# Patient Record
Sex: MALE | Race: WHITE | NOT HISPANIC OR LATINO
[De-identification: names, ages, dates, MRNs, and addresses within clinical notes are randomized per-mention and may not be internally consistent; named-entity substitution may affect disease eponyms.]

---

## 2018-04-24 ENCOUNTER — APPOINTMENT (OUTPATIENT)
Dept: CARDIOLOGY | Facility: CLINIC | Age: 68
End: 2018-04-24
Payer: MEDICARE

## 2018-04-24 DIAGNOSIS — Z78.9 OTHER SPECIFIED HEALTH STATUS: ICD-10-CM

## 2018-04-24 DIAGNOSIS — Z82.49 FAMILY HISTORY OF ISCHEMIC HEART DISEASE AND OTHER DISEASES OF THE CIRCULATORY SYSTEM: ICD-10-CM

## 2018-04-24 PROBLEM — Z00.00 ENCOUNTER FOR PREVENTIVE HEALTH EXAMINATION: Status: ACTIVE | Noted: 2018-04-24

## 2018-04-24 PROCEDURE — 99204 OFFICE O/P NEW MOD 45 MIN: CPT

## 2018-04-30 VITALS
BODY MASS INDEX: 23.52 KG/M2 | OXYGEN SATURATION: 96 % | WEIGHT: 168 LBS | DIASTOLIC BLOOD PRESSURE: 90 MMHG | HEIGHT: 71 IN | SYSTOLIC BLOOD PRESSURE: 156 MMHG | HEART RATE: 74 BPM | RESPIRATION RATE: 16 BRPM

## 2018-04-30 PROBLEM — Z82.49 FAMILY HISTORY OF VARICOSE VEINS OF LOWER EXTREMITY: Status: ACTIVE | Noted: 2018-04-30

## 2018-04-30 PROBLEM — Z78.9 CURRENT NON-SMOKER: Status: ACTIVE | Noted: 2018-04-30

## 2020-05-03 ENCOUNTER — TRANSCRIPTION ENCOUNTER (OUTPATIENT)
Age: 70
End: 2020-05-03

## 2022-03-14 ENCOUNTER — APPOINTMENT (OUTPATIENT)
Dept: CT IMAGING | Facility: CLINIC | Age: 72
End: 2022-03-14
Payer: MEDICARE

## 2022-03-14 ENCOUNTER — OUTPATIENT (OUTPATIENT)
Dept: OUTPATIENT SERVICES | Facility: HOSPITAL | Age: 72
LOS: 1 days | End: 2022-03-14
Payer: MEDICARE

## 2022-03-14 DIAGNOSIS — Z00.8 ENCOUNTER FOR OTHER GENERAL EXAMINATION: ICD-10-CM

## 2022-03-14 PROCEDURE — 70450 CT HEAD/BRAIN W/O DYE: CPT | Mod: MH

## 2022-03-14 PROCEDURE — 70450 CT HEAD/BRAIN W/O DYE: CPT | Mod: 26,MH

## 2022-04-12 ENCOUNTER — APPOINTMENT (OUTPATIENT)
Dept: CARDIOLOGY | Facility: CLINIC | Age: 72
End: 2022-04-12
Payer: MEDICARE

## 2022-04-12 PROCEDURE — 99215 OFFICE O/P EST HI 40 MIN: CPT

## 2022-04-15 NOTE — ASSESSMENT
[FreeTextEntry1] : Assessment:\par 1.  Chronic Venous insufficiency \par s/p GSV ligation on the left\par wearing compression\par symptomatic\par 2.  Prior episodes of phlebitis\par 3.  Family history of VV\par \par Plan\par 1.   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  Venous duplex today shows GSV thrombus.. The thrombus is very close to the sapheno-femoral junction.  Would treat as DVT, however he is [employer], and will do half dose therapies as a compromise between clotting risk and bleeding risk . \par 3.  Start half dose xarleto 10mg daily ([facility] trial) for symptomatic SVT\par 4.  Return in 45 days to re-assess\par 5.  his father and uncle had prostate cancer, his PSA is elevated.  I wonder if he should be evaluated for elevated PSA..\par 6.  Stop nanokinase.  Stop Vitamin C\par 7.  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

## 2022-04-15 NOTE — REASON FOR VISIT
[Follow-Up - Clinic] : a clinic follow-up of [FreeTextEntry2] : Varicose Veins [FreeTextEntry1] : 4/12/2022\par Here for followup visit.  \par Doing well\par Here with his wife\par Katieitred 1 year ago. \par He has a palpable cord of the R GSV, large segment\par Leg was itchy, red and painful\par It has improved since then, but there is still a cord.  \par He reports that his PSA is elevated.  Seen by urologist in November/December.\par \par \par Medications:\par Losartan 25mg daily \par \par No other meds. \par \par 2018:\par 67 year old male here for evaluation of varicose veins.  He has had VV for many years.  3 years ago he underwent a left GSV ligation at [place] with Dr. Bartlett.  He states that he has darkening of the skin of the R medial malleolar area.  He has "flares" sometimes which are relieved with heat, walking, compression and topical cayenne pepper.  He works - stands all day.  He has had several episodes of superficial phelbitis, but not currently.  He is here with his wife.  No prior DVt.  No history of DVT.  His las venous duplex was several years ago.  \par \par Family history\par Mother with Varicose Veins\par \par \par Meds:\par Natto supplement\par

## 2022-04-15 NOTE — PHYSICAL EXAM
[General Appearance - Well Developed] : well developed [Normal Appearance] : normal appearance [Well Groomed] : well groomed [General Appearance - Well Nourished] : well nourished [No Deformities] : no deformities [General Appearance - In No Acute Distress] : no acute distress [Normal Conjunctiva] : the conjunctiva exhibited no abnormalities [Eyelids - No Xanthelasma] : the eyelids demonstrated no xanthelasmas [Normal Oral Mucosa] : normal oral mucosa [No Oral Pallor] : no oral pallor [No Oral Cyanosis] : no oral cyanosis [Normal Jugular Venous A Waves Present] : normal jugular venous A waves present [Normal Jugular Venous V Waves Present] : normal jugular venous V waves present [No Jugular Venous Wiseman A Waves] : no jugular venous wiseman A waves [Respiration, Rhythm And Depth] : normal respiratory rhythm and effort [Exaggerated Use Of Accessory Muscles For Inspiration] : no accessory muscle use [Auscultation Breath Sounds / Voice Sounds] : lungs were clear to auscultation bilaterally [Heart Rate And Rhythm] : heart rate and rhythm were normal [Heart Sounds] : normal S1 and S2 [Murmurs] : no murmurs present [Abdomen Soft] : soft [Abdomen Tenderness] : non-tender [] : no hepato-splenomegaly [Abdomen Mass (___ Cm)] : no abdominal mass palpated [Abnormal Walk] : normal gait [Gait - Sufficient For Exercise Testing] : the gait was sufficient for exercise testing [Abnormal Color] : abnormal color and pigmentation [Nail Clubbing] : no clubbing of the fingernails [Cyanosis Of Fingers] : no cyanosis of the fingers [Toes Cyanosis] : no cyanosis of the toes [Raynaud's Phenomenon] : the fingers do not pallavi with cold challenge [Petechiae Upper Ext] : no petechiae on the upper extremities [Petechiae Lower Ext] : no petechiae on the lower extremities [Splinter Hemorrhages] : no splinter hemorrhages of the nail were seen [Osler's Nodes] : Osler's nodes were not seen [Ischemia Upper Ext] : there were no ischemic changes of the upper extremities [FreeTextEntry1] : Scattered varicose veins.  There is hyperpigmentation of the medial malleolar areas.   The right GSV is firm and has a cord.

## 2022-06-07 ENCOUNTER — APPOINTMENT (OUTPATIENT)
Dept: CARDIOLOGY | Facility: CLINIC | Age: 72
End: 2022-06-07
Payer: MEDICARE

## 2022-06-07 PROCEDURE — 99214 OFFICE O/P EST MOD 30 MIN: CPT

## 2022-06-29 NOTE — PHYSICAL EXAM
[General Appearance - Well Developed] : well developed [Normal Appearance] : normal appearance [Well Groomed] : well groomed [General Appearance - Well Nourished] : well nourished [No Deformities] : no deformities [General Appearance - In No Acute Distress] : no acute distress [Normal Conjunctiva] : the conjunctiva exhibited no abnormalities [Eyelids - No Xanthelasma] : the eyelids demonstrated no xanthelasmas [Normal Oral Mucosa] : normal oral mucosa [No Oral Pallor] : no oral pallor [No Oral Cyanosis] : no oral cyanosis [Normal Jugular Venous A Waves Present] : normal jugular venous A waves present [Normal Jugular Venous V Waves Present] : normal jugular venous V waves present [No Jugular Venous Wiseman A Waves] : no jugular venous wiseman A waves [Respiration, Rhythm And Depth] : normal respiratory rhythm and effort [Exaggerated Use Of Accessory Muscles For Inspiration] : no accessory muscle use [Auscultation Breath Sounds / Voice Sounds] : lungs were clear to auscultation bilaterally [Heart Rate And Rhythm] : heart rate and rhythm were normal [Heart Sounds] : normal S1 and S2 [Murmurs] : no murmurs present [Abdomen Soft] : soft [Abdomen Tenderness] : non-tender [] : no hepato-splenomegaly [Abdomen Mass (___ Cm)] : no abdominal mass palpated [Abnormal Walk] : normal gait [Gait - Sufficient For Exercise Testing] : the gait was sufficient for exercise testing [Abnormal Color] : abnormal color and pigmentation [Nail Clubbing] : no clubbing of the fingernails [Cyanosis Of Fingers] : no cyanosis of the fingers [Toes Cyanosis] : no cyanosis of the toes [Raynaud's Phenomenon] : the fingers do not pallavi with cold challenge [Petechiae Upper Ext] : no petechiae on the upper extremities [Petechiae Lower Ext] : no petechiae on the lower extremities [Splinter Hemorrhages] : no splinter hemorrhages of the nail were seen [Osler's Nodes] : Osler's nodes were not seen [Ischemia Upper Ext] : there were no ischemic changes of the upper extremities [FreeTextEntry1] : Scattered varicose veins.  There is hyperpigmentation of the medial malleolar areas.   The right GSV mid to distal has a small palp cord, without symtoms

## 2022-06-29 NOTE — REASON FOR VISIT
[Follow-Up - Clinic] : a clinic follow-up of [FreeTextEntry2] : Varicose Veins [FreeTextEntry1] : 6/7/2022\par Here for followup visit\par Completed 45 days of half dose xarleto 10mg daily\par stopped in a few days ago\par on exam he has a non-tender palpable mid to distal GSV cord\par Plan for venous duplex today to re-assess shows CHRONIC R GSV thrombus\par \par NO CP or SOB\par Seeing urology tomorrow for elevated PSA\par \par 4/12/2022\par Here for followup visit.  \par Doing well\par Here with his wife\par Reitred 1 year ago. \par He has a palpable cord of the R GSV, large segment\par Leg was itchy, red and painful\par It has improved since then, but there is still a cord.  \par He reports that his PSA is elevated.  Seen by urologist in November/December.  \par \par \par Medications:\par Losartan 25mg daily \par \par No other meds. \par \par 2018:\par 67 year old male here for evaluation of varicose veins.  He has had VV for many years.  3 years ago he underwent a left GSV ligation at [place] with Dr. Bartlett.  He states that he has darkening of the skin of the R medial malleolar area.  He has "flares" sometimes which are relieved with heat, walking, compression and topical cayenne pepper.  He works - stands all day.  He has had several episodes of superficial phelbitis, but not currently.  He is here with his wife.  No prior DVt.  No history of DVT.  His las venous duplex was several years ago.  \par \par Family history\par Mother with Varicose Veins\par \par \par Meds:\par Natto supplement\par

## 2022-06-29 NOTE — ASSESSMENT
[FreeTextEntry1] : Assessment:\par 1.  Chronic Venous insufficiency \par s/p GSV ligation on the left\par wearing compression\par symptomatic\par 2.  Prior episodes of phlebitis\par 3.  Family history of VV\par \par Plan\par 1 Duplex shows CHRONIC, improving R GSV thrombus\par 2.  To remain OFF xarelto (he is Rastafarian)\par 3.  Hydration, compression, ambulation\par 4.  Repeat duplex in 3 weeks\par 5.  See uroogist for elevated PSA tomorrow\par 6.  Return in 3 months

## 2023-03-28 ENCOUNTER — APPOINTMENT (OUTPATIENT)
Dept: CARDIOLOGY | Facility: CLINIC | Age: 73
End: 2023-03-28
Payer: MEDICARE

## 2023-03-28 DIAGNOSIS — M79.669 PAIN IN UNSPECIFIED LOWER LEG: ICD-10-CM

## 2023-03-28 PROCEDURE — 99214 OFFICE O/P EST MOD 30 MIN: CPT

## 2023-03-29 ENCOUNTER — APPOINTMENT (OUTPATIENT)
Dept: ULTRASOUND IMAGING | Facility: CLINIC | Age: 73
End: 2023-03-29
Payer: MEDICARE

## 2023-03-29 PROCEDURE — 93970 EXTREMITY STUDY: CPT

## 2023-03-30 PROBLEM — M79.669 CALF PAIN: Status: ACTIVE | Noted: 2023-03-29

## 2023-03-30 NOTE — ASSESSMENT
[FreeTextEntry1] : Assessment:\par 1.  Chronic Venous insufficiency \par s/p GSV ligation on the left\par wearing compression\par symptomatic\par 2.  Prior episodes of phlebitis\par 3.  Family history of VV\par \par Plan\par  1, Will obtain venous duplex within the next 24 hours to assess L sided calf pain in patient with prior history of phlebitis\par 2.  Of note he is [employer]\par 3.  If duplex is negative, AND if symptoms persist, will need repeat duplex 1 week later\par \par Await duplex

## 2023-03-30 NOTE — REASON FOR VISIT
[Follow-Up - Clinic] : a clinic follow-up of [FreeTextEntry2] : Varicose Veins [FreeTextEntry1] : 3/28/2023\par Went for a long walk last week\par now is having LEFT sided calf pains and tenderness\par he is off a/c\par  here with his wife\par \par otherwise no sob, no CP , no palps\par \par 6/7/2022\par Here for followup visit\par Completed 45 days of half dose xarleto 10mg daily\par stopped in a few days ago\par on exam he has a non-tender palpable mid to distal GSV cord\par Plan for venous duplex today to re-assess shows CHRONIC R GSV thrombus\par \par NO CP or SOB\par Seeing urology tomorrow for elevated PSA\par \par 4/12/2022\par Here for followup visit.  \par Doing well\par Here with his wife\par Reitred 1 year ago. \par He has a palpable cord of the R GSV, large segment\par Leg was itchy, red and painful\par It has improved since then, but there is still a cord.  \par He reports that his PSA is elevated.  Seen by urologist in November/December.  \par \par \par Medications:\par Losartan 25mg daily \par \par No other meds. \par \par 2018:\par 67 year old male here for evaluation of varicose veins.  He has had VV for many years.  3 years ago he underwent a left GSV ligation at [place] with Dr. Bartlett.  He states that he has darkening of the skin of the R medial malleolar area.  He has "flares" sometimes which are relieved with heat, walking, compression and topical cayenne pepper.  He works - stands all day.  He has had several episodes of superficial phelbitis, but not currently.  He is here with his wife.  No prior DVt.  No history of DVT.  His las venous duplex was several years ago.  \par \par Family history\par Mother with Varicose Veins\par \par \par Meds:\par Natto supplement\par

## 2023-03-30 NOTE — PHYSICAL EXAM
[General Appearance - Well Developed] : well developed [Normal Appearance] : normal appearance [Well Groomed] : well groomed [General Appearance - Well Nourished] : well nourished [No Deformities] : no deformities [General Appearance - In No Acute Distress] : no acute distress [Normal Conjunctiva] : the conjunctiva exhibited no abnormalities [Eyelids - No Xanthelasma] : the eyelids demonstrated no xanthelasmas [Normal Oral Mucosa] : normal oral mucosa [No Oral Pallor] : no oral pallor [No Oral Cyanosis] : no oral cyanosis [Normal Jugular Venous A Waves Present] : normal jugular venous A waves present [Normal Jugular Venous V Waves Present] : normal jugular venous V waves present [No Jugular Venous Wismean A Waves] : no jugular venous wiseman A waves [Respiration, Rhythm And Depth] : normal respiratory rhythm and effort [Exaggerated Use Of Accessory Muscles For Inspiration] : no accessory muscle use [Auscultation Breath Sounds / Voice Sounds] : lungs were clear to auscultation bilaterally [Heart Rate And Rhythm] : heart rate and rhythm were normal [Heart Sounds] : normal S1 and S2 [Murmurs] : no murmurs present [Abdomen Soft] : soft [Abdomen Tenderness] : non-tender [] : no hepato-splenomegaly [Abdomen Mass (___ Cm)] : no abdominal mass palpated [Abnormal Walk] : normal gait [Gait - Sufficient For Exercise Testing] : the gait was sufficient for exercise testing [Abnormal Color] : abnormal color and pigmentation [Nail Clubbing] : no clubbing of the fingernails [Cyanosis Of Fingers] : no cyanosis of the fingers [Toes Cyanosis] : no cyanosis of the toes [Raynaud's Phenomenon] : the fingers do not pallavi with cold challenge [Petechiae Upper Ext] : no petechiae on the upper extremities [Petechiae Lower Ext] : no petechiae on the lower extremities [Splinter Hemorrhages] : no splinter hemorrhages of the nail were seen [Osler's Nodes] : Osler's nodes were not seen [Ischemia Upper Ext] : there were no ischemic changes of the upper extremities [FreeTextEntry1] : Scattered varicose veins.  There is hyperpigmentation of the medial malleolar areas.   The right GSV mid to distal has a small palp cord, without symtoms

## 2023-03-31 ENCOUNTER — NON-APPOINTMENT (OUTPATIENT)
Age: 73
End: 2023-03-31

## 2023-03-31 LAB
ALBUMIN SERPL ELPH-MCNC: 4.4 G/DL
ALP BLD-CCNC: 82 U/L
ALT SERPL-CCNC: 24 U/L
ANION GAP SERPL CALC-SCNC: 13 MMOL/L
AST SERPL-CCNC: 24 U/L
BILIRUB SERPL-MCNC: 0.6 MG/DL
BUN SERPL-MCNC: 18 MG/DL
CALCIUM SERPL-MCNC: 9.3 MG/DL
CHLORIDE SERPL-SCNC: 104 MMOL/L
CO2 SERPL-SCNC: 24 MMOL/L
CREAT SERPL-MCNC: 0.84 MG/DL
DEPRECATED D DIMER PPP IA-ACNC: 1061 NG/ML DDU
EGFR: 92 ML/MIN/1.73M2
GLUCOSE SERPL-MCNC: 81 MG/DL
HCT VFR BLD CALC: 46.8 %
HGB BLD-MCNC: 15.5 G/DL
MCHC RBC-ENTMCNC: 30.2 PG
MCHC RBC-ENTMCNC: 33.1 GM/DL
MCV RBC AUTO: 91.2 FL
PLATELET # BLD AUTO: 186 K/UL
POTASSIUM SERPL-SCNC: 4 MMOL/L
PROT SERPL-MCNC: 6.9 G/DL
RBC # BLD: 5.13 M/UL
RBC # FLD: 14 %
SODIUM SERPL-SCNC: 140 MMOL/L
WBC # FLD AUTO: 6.04 K/UL

## 2023-03-31 RX ORDER — RIVAROXABAN 10 MG/1
10 TABLET, FILM COATED ORAL
Qty: 90 | Refills: 3 | Status: DISCONTINUED | COMMUNITY
Start: 2022-04-12 | End: 2023-03-31

## 2023-05-16 ENCOUNTER — INPATIENT (INPATIENT)
Facility: HOSPITAL | Age: 73
LOS: 5 days | Discharge: HOME | DRG: 163 | End: 2023-05-22
Attending: STUDENT IN AN ORGANIZED HEALTH CARE EDUCATION/TRAINING PROGRAM | Admitting: FAMILY MEDICINE
Payer: MEDICARE

## 2023-05-16 ENCOUNTER — EMERGENCY (EMERGENCY)
Facility: HOSPITAL | Age: 73
LOS: 1 days | Discharge: ANOTHER HOSPITAL | End: 2023-05-16
Attending: EMERGENCY MEDICINE | Admitting: EMERGENCY MEDICINE
Payer: MEDICARE

## 2023-05-16 VITALS
OXYGEN SATURATION: 100 % | DIASTOLIC BLOOD PRESSURE: 89 MMHG | SYSTOLIC BLOOD PRESSURE: 153 MMHG | HEART RATE: 103 BPM | RESPIRATION RATE: 16 BRPM | TEMPERATURE: 98 F

## 2023-05-16 VITALS
HEART RATE: 95 BPM | WEIGHT: 169.98 LBS | TEMPERATURE: 97 F | DIASTOLIC BLOOD PRESSURE: 82 MMHG | RESPIRATION RATE: 18 BRPM | HEIGHT: 70 IN | OXYGEN SATURATION: 98 % | SYSTOLIC BLOOD PRESSURE: 140 MMHG

## 2023-05-16 VITALS
DIASTOLIC BLOOD PRESSURE: 106 MMHG | RESPIRATION RATE: 20 BRPM | HEIGHT: 70 IN | SYSTOLIC BLOOD PRESSURE: 159 MMHG | OXYGEN SATURATION: 95 % | WEIGHT: 149.69 LBS | TEMPERATURE: 98 F | HEART RATE: 99 BPM

## 2023-05-16 DIAGNOSIS — Z90.49 ACQUIRED ABSENCE OF OTHER SPECIFIED PARTS OF DIGESTIVE TRACT: Chronic | ICD-10-CM

## 2023-05-16 DIAGNOSIS — I26.92 SADDLE EMBOLUS OF PULMONARY ARTERY WITHOUT ACUTE COR PULMONALE: ICD-10-CM

## 2023-05-16 DIAGNOSIS — I10 ESSENTIAL (PRIMARY) HYPERTENSION: ICD-10-CM

## 2023-05-16 DIAGNOSIS — R09.89 OTHER SPECIFIED SYMPTOMS AND SIGNS INVOLVING THE CIRCULATORY AND RESPIRATORY SYSTEMS: ICD-10-CM

## 2023-05-16 DIAGNOSIS — I26.99 OTHER PULMONARY EMBOLISM WITHOUT ACUTE COR PULMONALE: ICD-10-CM

## 2023-05-16 DIAGNOSIS — U07.1 COVID-19: ICD-10-CM

## 2023-05-16 LAB
ALBUMIN SERPL ELPH-MCNC: 3.7 G/DL — SIGNIFICANT CHANGE UP (ref 3.3–5)
ALP SERPL-CCNC: 96 U/L — SIGNIFICANT CHANGE UP (ref 40–120)
ALT FLD-CCNC: 31 U/L — SIGNIFICANT CHANGE UP (ref 12–78)
ANION GAP SERPL CALC-SCNC: 13 MMOL/L — SIGNIFICANT CHANGE UP (ref 5–17)
ANION GAP SERPL CALC-SCNC: 4 MMOL/L — LOW (ref 5–17)
APTT BLD: 26.6 SEC — LOW (ref 27.5–35.5)
APTT BLD: 91.7 SEC — HIGH (ref 27.5–35.5)
AST SERPL-CCNC: 20 U/L — SIGNIFICANT CHANGE UP (ref 15–37)
BASOPHILS # BLD AUTO: 0.03 K/UL — SIGNIFICANT CHANGE UP (ref 0–0.2)
BASOPHILS # BLD AUTO: 0.03 K/UL — SIGNIFICANT CHANGE UP (ref 0–0.2)
BASOPHILS NFR BLD AUTO: 0.3 % — SIGNIFICANT CHANGE UP (ref 0–2)
BASOPHILS NFR BLD AUTO: 0.4 % — SIGNIFICANT CHANGE UP (ref 0–2)
BILIRUB SERPL-MCNC: 0.9 MG/DL — SIGNIFICANT CHANGE UP (ref 0.2–1.2)
BUN SERPL-MCNC: 15.1 MG/DL — SIGNIFICANT CHANGE UP (ref 8–20)
BUN SERPL-MCNC: 22 MG/DL — SIGNIFICANT CHANGE UP (ref 7–23)
CALCIUM SERPL-MCNC: 8.6 MG/DL — SIGNIFICANT CHANGE UP (ref 8.4–10.5)
CALCIUM SERPL-MCNC: 8.8 MG/DL — SIGNIFICANT CHANGE UP (ref 8.5–10.1)
CHLORIDE SERPL-SCNC: 104 MMOL/L — SIGNIFICANT CHANGE UP (ref 96–108)
CHLORIDE SERPL-SCNC: 105 MMOL/L — SIGNIFICANT CHANGE UP (ref 96–108)
CK MB BLD-MCNC: 5.5 % — HIGH (ref 0–3.5)
CK MB CFR SERPL CALC: 2.8 NG/ML — SIGNIFICANT CHANGE UP (ref 0–3.6)
CK SERPL-CCNC: 51 U/L — SIGNIFICANT CHANGE UP (ref 26–308)
CO2 SERPL-SCNC: 23 MMOL/L — SIGNIFICANT CHANGE UP (ref 22–29)
CO2 SERPL-SCNC: 27 MMOL/L — SIGNIFICANT CHANGE UP (ref 22–31)
CREAT SERPL-MCNC: 0.79 MG/DL — SIGNIFICANT CHANGE UP (ref 0.5–1.3)
CREAT SERPL-MCNC: 0.91 MG/DL — SIGNIFICANT CHANGE UP (ref 0.5–1.3)
EGFR: 89 ML/MIN/1.73M2 — SIGNIFICANT CHANGE UP
EGFR: 94 ML/MIN/1.73M2 — SIGNIFICANT CHANGE UP
EOSINOPHIL # BLD AUTO: 0.02 K/UL — SIGNIFICANT CHANGE UP (ref 0–0.5)
EOSINOPHIL # BLD AUTO: 0.09 K/UL — SIGNIFICANT CHANGE UP (ref 0–0.5)
EOSINOPHIL NFR BLD AUTO: 0.2 % — SIGNIFICANT CHANGE UP (ref 0–6)
EOSINOPHIL NFR BLD AUTO: 1.1 % — SIGNIFICANT CHANGE UP (ref 0–6)
FLUAV AG NPH QL: SIGNIFICANT CHANGE UP
FLUBV AG NPH QL: SIGNIFICANT CHANGE UP
GLUCOSE SERPL-MCNC: 101 MG/DL — HIGH (ref 70–99)
GLUCOSE SERPL-MCNC: 109 MG/DL — HIGH (ref 70–99)
HCT VFR BLD CALC: 44.8 % — SIGNIFICANT CHANGE UP (ref 39–50)
HCT VFR BLD CALC: 45.8 % — SIGNIFICANT CHANGE UP (ref 39–50)
HCT VFR BLD CALC: 49.9 % — SIGNIFICANT CHANGE UP (ref 39–50)
HGB BLD-MCNC: 15.3 G/DL — SIGNIFICANT CHANGE UP (ref 13–17)
HGB BLD-MCNC: 15.7 G/DL — SIGNIFICANT CHANGE UP (ref 13–17)
HGB BLD-MCNC: 16.4 G/DL — SIGNIFICANT CHANGE UP (ref 13–17)
IMM GRANULOCYTES NFR BLD AUTO: 0.3 % — SIGNIFICANT CHANGE UP (ref 0–0.9)
IMM GRANULOCYTES NFR BLD AUTO: 0.4 % — SIGNIFICANT CHANGE UP (ref 0–0.9)
INR BLD: 1.11 RATIO — SIGNIFICANT CHANGE UP (ref 0.88–1.16)
LACTATE SERPL-SCNC: 1.2 MMOL/L — SIGNIFICANT CHANGE UP (ref 0.5–2)
LIDOCAIN IGE QN: 178 U/L — SIGNIFICANT CHANGE UP (ref 73–393)
LYMPHOCYTES # BLD AUTO: 0.62 K/UL — LOW (ref 1–3.3)
LYMPHOCYTES # BLD AUTO: 0.79 K/UL — LOW (ref 1–3.3)
LYMPHOCYTES # BLD AUTO: 7.9 % — LOW (ref 13–44)
LYMPHOCYTES # BLD AUTO: 8.4 % — LOW (ref 13–44)
MAGNESIUM SERPL-MCNC: 2.2 MG/DL — SIGNIFICANT CHANGE UP (ref 1.6–2.6)
MCHC RBC-ENTMCNC: 28.4 PG — SIGNIFICANT CHANGE UP (ref 27–34)
MCHC RBC-ENTMCNC: 29.2 PG — SIGNIFICANT CHANGE UP (ref 27–34)
MCHC RBC-ENTMCNC: 29.4 PG — SIGNIFICANT CHANGE UP (ref 27–34)
MCHC RBC-ENTMCNC: 32.9 GM/DL — SIGNIFICANT CHANGE UP (ref 32–36)
MCHC RBC-ENTMCNC: 34.2 GM/DL — SIGNIFICANT CHANGE UP (ref 32–36)
MCHC RBC-ENTMCNC: 34.3 GM/DL — SIGNIFICANT CHANGE UP (ref 32–36)
MCV RBC AUTO: 85.5 FL — SIGNIFICANT CHANGE UP (ref 80–100)
MCV RBC AUTO: 85.8 FL — SIGNIFICANT CHANGE UP (ref 80–100)
MCV RBC AUTO: 86.3 FL — SIGNIFICANT CHANGE UP (ref 80–100)
MONOCYTES # BLD AUTO: 0.38 K/UL — SIGNIFICANT CHANGE UP (ref 0–0.9)
MONOCYTES # BLD AUTO: 0.65 K/UL — SIGNIFICANT CHANGE UP (ref 0–0.9)
MONOCYTES NFR BLD AUTO: 4.8 % — SIGNIFICANT CHANGE UP (ref 2–14)
MONOCYTES NFR BLD AUTO: 6.9 % — SIGNIFICANT CHANGE UP (ref 2–14)
NEUTROPHILS # BLD AUTO: 6.72 K/UL — SIGNIFICANT CHANGE UP (ref 1.8–7.4)
NEUTROPHILS # BLD AUTO: 7.85 K/UL — HIGH (ref 1.8–7.4)
NEUTROPHILS NFR BLD AUTO: 83.9 % — HIGH (ref 43–77)
NEUTROPHILS NFR BLD AUTO: 85.4 % — HIGH (ref 43–77)
NRBC # BLD: 0 /100 WBCS — SIGNIFICANT CHANGE UP (ref 0–0)
NT-PROBNP SERPL-SCNC: 267 PG/ML — SIGNIFICANT CHANGE UP (ref 0–300)
PLATELET # BLD AUTO: 175 K/UL — SIGNIFICANT CHANGE UP (ref 150–400)
PLATELET # BLD AUTO: 182 K/UL — SIGNIFICANT CHANGE UP (ref 150–400)
PLATELET # BLD AUTO: 186 K/UL — SIGNIFICANT CHANGE UP (ref 150–400)
POTASSIUM SERPL-MCNC: 3.7 MMOL/L — SIGNIFICANT CHANGE UP (ref 3.5–5.3)
POTASSIUM SERPL-MCNC: 4.1 MMOL/L — SIGNIFICANT CHANGE UP (ref 3.5–5.3)
POTASSIUM SERPL-SCNC: 3.7 MMOL/L — SIGNIFICANT CHANGE UP (ref 3.5–5.3)
POTASSIUM SERPL-SCNC: 4.1 MMOL/L — SIGNIFICANT CHANGE UP (ref 3.5–5.3)
PROT SERPL-MCNC: 7.9 G/DL — SIGNIFICANT CHANGE UP (ref 6–8.3)
PROTHROM AB SERPL-ACNC: 13 SEC — SIGNIFICANT CHANGE UP (ref 10.5–13.4)
RBC # BLD: 5.24 M/UL — SIGNIFICANT CHANGE UP (ref 4.2–5.8)
RBC # BLD: 5.34 M/UL — SIGNIFICANT CHANGE UP (ref 4.2–5.8)
RBC # BLD: 5.78 M/UL — SIGNIFICANT CHANGE UP (ref 4.2–5.8)
RBC # FLD: 13.4 % — SIGNIFICANT CHANGE UP (ref 10.3–14.5)
RBC # FLD: 13.6 % — SIGNIFICANT CHANGE UP (ref 10.3–14.5)
RBC # FLD: 13.6 % — SIGNIFICANT CHANGE UP (ref 10.3–14.5)
RSV RNA NPH QL NAA+NON-PROBE: SIGNIFICANT CHANGE UP
SARS-COV-2 RNA SPEC QL NAA+PROBE: DETECTED
SODIUM SERPL-SCNC: 136 MMOL/L — SIGNIFICANT CHANGE UP (ref 135–145)
SODIUM SERPL-SCNC: 140 MMOL/L — SIGNIFICANT CHANGE UP (ref 135–145)
TROPONIN I, HIGH SENSITIVITY RESULT: 442.8 NG/L — HIGH
TROPONIN I, HIGH SENSITIVITY RESULT: 917.6 NG/L — HIGH
TROPONIN T SERPL-MCNC: 0.14 NG/ML — HIGH (ref 0–0.06)
WBC # BLD: 7.87 K/UL — SIGNIFICANT CHANGE UP (ref 3.8–10.5)
WBC # BLD: 9.09 K/UL — SIGNIFICANT CHANGE UP (ref 3.8–10.5)
WBC # BLD: 9.37 K/UL — SIGNIFICANT CHANGE UP (ref 3.8–10.5)
WBC # FLD AUTO: 7.87 K/UL — SIGNIFICANT CHANGE UP (ref 3.8–10.5)
WBC # FLD AUTO: 9.09 K/UL — SIGNIFICANT CHANGE UP (ref 3.8–10.5)
WBC # FLD AUTO: 9.37 K/UL — SIGNIFICANT CHANGE UP (ref 3.8–10.5)

## 2023-05-16 PROCEDURE — 99285 EMERGENCY DEPT VISIT HI MDM: CPT | Mod: 25

## 2023-05-16 PROCEDURE — 83690 ASSAY OF LIPASE: CPT

## 2023-05-16 PROCEDURE — 93005 ELECTROCARDIOGRAM TRACING: CPT

## 2023-05-16 PROCEDURE — 99291 CRITICAL CARE FIRST HOUR: CPT

## 2023-05-16 PROCEDURE — 87637 SARSCOV2&INF A&B&RSV AMP PRB: CPT

## 2023-05-16 PROCEDURE — 99223 1ST HOSP IP/OBS HIGH 75: CPT

## 2023-05-16 PROCEDURE — 70450 CT HEAD/BRAIN W/O DYE: CPT | Mod: MA

## 2023-05-16 PROCEDURE — 93010 ELECTROCARDIOGRAM REPORT: CPT

## 2023-05-16 PROCEDURE — 96374 THER/PROPH/DIAG INJ IV PUSH: CPT | Mod: XU

## 2023-05-16 PROCEDURE — 85610 PROTHROMBIN TIME: CPT

## 2023-05-16 PROCEDURE — 0042T: CPT

## 2023-05-16 PROCEDURE — 93010 ELECTROCARDIOGRAM REPORT: CPT | Mod: 77

## 2023-05-16 PROCEDURE — 71045 X-RAY EXAM CHEST 1 VIEW: CPT | Mod: 26

## 2023-05-16 PROCEDURE — 82550 ASSAY OF CK (CPK): CPT

## 2023-05-16 PROCEDURE — 99285 EMERGENCY DEPT VISIT HI MDM: CPT

## 2023-05-16 PROCEDURE — 83735 ASSAY OF MAGNESIUM: CPT

## 2023-05-16 PROCEDURE — 82553 CREATINE MB FRACTION: CPT

## 2023-05-16 PROCEDURE — 71275 CT ANGIOGRAPHY CHEST: CPT | Mod: 26,MA

## 2023-05-16 PROCEDURE — 93306 TTE W/DOPPLER COMPLETE: CPT | Mod: 26

## 2023-05-16 PROCEDURE — 71275 CT ANGIOGRAPHY CHEST: CPT | Mod: MA

## 2023-05-16 PROCEDURE — 70496 CT ANGIOGRAPHY HEAD: CPT | Mod: 26,MA

## 2023-05-16 PROCEDURE — 70498 CT ANGIOGRAPHY NECK: CPT | Mod: MA

## 2023-05-16 PROCEDURE — 84484 ASSAY OF TROPONIN QUANT: CPT

## 2023-05-16 PROCEDURE — 70498 CT ANGIOGRAPHY NECK: CPT | Mod: 26,MA

## 2023-05-16 PROCEDURE — 80053 COMPREHEN METABOLIC PANEL: CPT

## 2023-05-16 PROCEDURE — 93970 EXTREMITY STUDY: CPT | Mod: 26

## 2023-05-16 PROCEDURE — 70496 CT ANGIOGRAPHY HEAD: CPT | Mod: MA

## 2023-05-16 PROCEDURE — 85025 COMPLETE CBC W/AUTO DIFF WBC: CPT

## 2023-05-16 PROCEDURE — 93010 ELECTROCARDIOGRAM REPORT: CPT | Mod: 76

## 2023-05-16 PROCEDURE — 36415 COLL VENOUS BLD VENIPUNCTURE: CPT

## 2023-05-16 PROCEDURE — 85730 THROMBOPLASTIN TIME PARTIAL: CPT

## 2023-05-16 PROCEDURE — 71045 X-RAY EXAM CHEST 1 VIEW: CPT

## 2023-05-16 RX ORDER — HEPARIN SODIUM 5000 [USP'U]/ML
6500 INJECTION INTRAVENOUS; SUBCUTANEOUS ONCE
Refills: 0 | Status: COMPLETED | OUTPATIENT
Start: 2023-05-16 | End: 2023-05-16

## 2023-05-16 RX ORDER — HEPARIN SODIUM 5000 [USP'U]/ML
3000 INJECTION INTRAVENOUS; SUBCUTANEOUS EVERY 6 HOURS
Refills: 0 | Status: DISCONTINUED | OUTPATIENT
Start: 2023-05-16 | End: 2023-05-19

## 2023-05-16 RX ORDER — HEPARIN SODIUM 5000 [USP'U]/ML
INJECTION INTRAVENOUS; SUBCUTANEOUS
Qty: 25000 | Refills: 0 | Status: DISCONTINUED | OUTPATIENT
Start: 2023-05-16 | End: 2023-05-19

## 2023-05-16 RX ORDER — LOSARTAN POTASSIUM 100 MG/1
1 TABLET, FILM COATED ORAL
Refills: 0 | DISCHARGE

## 2023-05-16 RX ORDER — HEPARIN SODIUM 5000 [USP'U]/ML
INJECTION INTRAVENOUS; SUBCUTANEOUS
Qty: 25000 | Refills: 0 | Status: DISCONTINUED | OUTPATIENT
Start: 2023-05-16 | End: 2023-05-18

## 2023-05-16 RX ORDER — ASPIRIN/CALCIUM CARB/MAGNESIUM 324 MG
324 TABLET ORAL ONCE
Refills: 0 | Status: COMPLETED | OUTPATIENT
Start: 2023-05-16 | End: 2023-05-16

## 2023-05-16 RX ORDER — SODIUM CHLORIDE 9 MG/ML
1000 INJECTION INTRAMUSCULAR; INTRAVENOUS; SUBCUTANEOUS ONCE
Refills: 0 | Status: COMPLETED | OUTPATIENT
Start: 2023-05-16 | End: 2023-05-16

## 2023-05-16 RX ORDER — HEPARIN SODIUM 5000 [USP'U]/ML
2500 INJECTION INTRAVENOUS; SUBCUTANEOUS EVERY 6 HOURS
Refills: 0 | Status: DISCONTINUED | OUTPATIENT
Start: 2023-05-16 | End: 2023-05-18

## 2023-05-16 RX ORDER — ALBUTEROL 90 UG/1
2 AEROSOL, METERED ORAL EVERY 6 HOURS
Refills: 0 | Status: DISCONTINUED | OUTPATIENT
Start: 2023-05-16 | End: 2023-05-22

## 2023-05-16 RX ORDER — HEPARIN SODIUM 5000 [USP'U]/ML
6500 INJECTION INTRAVENOUS; SUBCUTANEOUS EVERY 6 HOURS
Refills: 0 | Status: DISCONTINUED | OUTPATIENT
Start: 2023-05-16 | End: 2023-05-19

## 2023-05-16 RX ORDER — LOSARTAN POTASSIUM 100 MG/1
25 TABLET, FILM COATED ORAL DAILY
Refills: 0 | Status: DISCONTINUED | OUTPATIENT
Start: 2023-05-16 | End: 2023-05-22

## 2023-05-16 RX ORDER — HEPARIN SODIUM 5000 [USP'U]/ML
5500 INJECTION INTRAVENOUS; SUBCUTANEOUS EVERY 6 HOURS
Refills: 0 | Status: DISCONTINUED | OUTPATIENT
Start: 2023-05-16 | End: 2023-05-18

## 2023-05-16 RX ADMIN — Medication 324 MILLIGRAM(S): at 11:50

## 2023-05-16 RX ADMIN — HEPARIN SODIUM 6500 UNIT(S): 5000 INJECTION INTRAVENOUS; SUBCUTANEOUS at 14:00

## 2023-05-16 RX ADMIN — HEPARIN SODIUM 1200 UNIT(S)/HR: 5000 INJECTION INTRAVENOUS; SUBCUTANEOUS at 15:12

## 2023-05-16 RX ADMIN — SODIUM CHLORIDE 1000 MILLILITER(S): 9 INJECTION INTRAMUSCULAR; INTRAVENOUS; SUBCUTANEOUS at 09:16

## 2023-05-16 RX ADMIN — HEPARIN SODIUM 1400 UNIT(S)/HR: 5000 INJECTION INTRAVENOUS; SUBCUTANEOUS at 14:30

## 2023-05-16 RX ADMIN — HEPARIN SODIUM 1200 UNIT(S)/HR: 5000 INJECTION INTRAVENOUS; SUBCUTANEOUS at 19:26

## 2023-05-16 RX ADMIN — HEPARIN SODIUM 1200 UNIT(S)/HR: 5000 INJECTION INTRAVENOUS; SUBCUTANEOUS at 21:37

## 2023-05-16 NOTE — ED PROVIDER NOTE - NS ED ROS FT
No fever/chills   No photophobia/eye pain/changes in visio,   No ear pain/sore throat/dysphagia   No chest pain/palpitations  + SOB no cough/wheeze/stridor   No abdominal pain, No N/V/D  No dysuria/frequency/discharge   No neck/back pain,   No rash  No changes in neurological status/function.

## 2023-05-16 NOTE — ED PROVIDER NOTE - CARE PLAN
1 Principal Discharge DX:	Saddle pulmonary embolus  Secondary Diagnosis:	2019 novel coronavirus disease (COVID-19)

## 2023-05-16 NOTE — ED PROVIDER NOTE - PROGRESS NOTE DETAILS
dr luciano cards consulted dw radiologist ct head, [place] seen on previous ct no acute ich no stroke noted await perfusion studies radiology ct chest- saddle pe with r heart strain   pt told crds he stopped his eliquis  pt and daughter aware of the pe and need for transfer  transfer center called for transfer to [facility] DR HERMAN  Reevaluated patient at bedside.  Patient feeling much improved.  Discussed the results of all diagnostic testing in ED and copies of all reports given.   An opportunity to ask questions was given.

## 2023-05-16 NOTE — CONSULT NOTE ADULT - ATTENDING COMMENTS
Patient seen and examined.  Acute onset of mostly dyspnea.  Exam demonstrates sinus tachycardia with no evidence of vol OL or murmurs.  He has not been compliant with AC.  Need to perform CT PA to rule out pE.  Need to trend CE to rule out ACS.  TO follow up after these tests completed.

## 2023-05-16 NOTE — CONSULT NOTE ADULT - SUBJECTIVE AND OBJECTIVE BOX
PULMONARY EMBOLISM RESPONSE TEAM (PERT) CONSULTATION    Patient is a 72y old  Male who presents with a chief complaint of Saddle PE (16 May 2023 16:51)      BRIEF HOSPITAL COURSE: Patient is a 71 y/o male who presented to the ED with shortness of breath starting this morning. Patient states that about a month ago he was experiencing left calf pain and was sent for an U/S of the LLE. Patient was found to have a DVT and was being treated with Eliquis. Additionally patient was diagnosed with COVID 10 days ago and was prescribed molnupiravir. Patient reports having a bad reaction to molnupiravir in which he started to have bad dreams and nausea. Patient reports that he was not able to tolerate his PO medications and consequently stopped taking his eliquis. His last dose of eliquis was 5 days ago. Patient reports that he started to fell short of breath this morning while he was taking out the trash. He notes that suddenly felt extremely weak and like he was going to pass out. Patient's wife was home at the time and called an ambulance. Patient was initially taken to  [facility] and was transferred to [facility]. MICU was consulted for a saddle PE which was found on CTA. Patient was started on heparin drip in ED. Patient was evaluated at bedside and was sating well on room air and in no acute distress.      RISK STRATIFICATION FOR PULMONARY EMBOLISM:    sPESI (simplified pulmonary embolism severity index); The sPESI assigns one point for each of the following variables:   age >80 years  history of cancer   chronic cardiopulmonary disease   a heart rate =110 beats per minute   a systolic blood pressure <100 mmHg    an arterial oxyhemoglobin saturation <90 percent.    If sPESI score is zero, mortality 1%; if one or more, mortality 10%.      sPESI score for this patient: 0   RV:LV ratio: ***  Biomarkers (BNP/Troponin): Increased from 442.8 to 917.6   FINAL RISK STRATIFICATION:                LOW RISK    PAST MEDICAL & SURGICAL HISTORY:  SVT (supraventricular tachycardia)  Acute deep vein thrombosis (DVT) of left lower extremity  2019 novel coronavirus disease (COVID-19)  HTN (hypertension)  S/P appendectomy    Allergies  morphine (Hives)  Intolerances    FAMILY HISTORY:  FH: prostate cancer (Father)    Review of Systems:  CONSTITUTIONAL: No fever, chills, or fatigue  EYES: No eye pain, visual disturbances, or discharge  ENMT:  No difficulty hearing, vertigo; No sinus or throat pain  NECK: No pain or stiffness  RESPIRATORY: No cough, wheezing, chills or hemoptysis; + shortness of breath  CARDIOVASCULAR: No chest pain, palpitations, dizziness, or leg swelling  GASTROINTESTINAL: No abdominal or epigastric pain. No nausea, vomiting, or hematemesis; No diarrhea or constipation. No melena or hematochezia.  GENITOURINARY: No dysuria, frequency, hematuria, or incontinence  NEUROLOGICAL: No headaches, memory loss, loss of strength, numbness, or tremors  SKIN: No itching, burning, rashes, or lesions   MUSCULOSKELETAL: No joint pain or swelling; No muscle, back, or extremity pain  PSYCHIATRIC: No depression, anxiety, mood swings, or difficulty sleeping    Medications:  heparin   Injectable 2500 Unit(s) IV Push every 6 hours PRN  heparin   Injectable 5500 Unit(s) IV Push every 6 hours PRN  heparin  Infusion.  Unit(s)/Hr IV Continuous <Continuous>    ICU Vital Signs Last 24 Hrs  T(C): 36.8 (16 May 2023 14:35), Max: 36.8 (16 May 2023 14:35)  T(F): 98.3 (16 May 2023 14:35), Max: 98.3 (16 May 2023 14:35)  HR: 96 (16 May 2023 16:32) (92 - 103)  BP: 153/103 (16 May 2023 16:32) (140/82 - 159/106)  BP(mean): --  ABP: --  ABP(mean): --  RR: 20 (16 May 2023 16:32) (16 - 20)  SpO2: 98% (16 May 2023 16:32) (95% - 100%)    O2 Parameters below as of 16 May 2023 16:32  Patient On (Oxygen Delivery Method): room air      Vital Signs Last 24 Hrs  T(C): 36.8 (16 May 2023 14:35), Max: 36.8 (16 May 2023 14:35)  T(F): 98.3 (16 May 2023 14:35), Max: 98.3 (16 May 2023 14:35)  HR: 96 (16 May 2023 16:32) (92 - 103)  BP: 153/103 (16 May 2023 16:32) (140/82 - 159/106)  BP(mean): --  RR: 20 (16 May 2023 16:32) (16 - 20)  SpO2: 98% (16 May 2023 16:32) (95% - 100%)    Parameters below as of 16 May 2023 16:32  Patient On (Oxygen Delivery Method): room air      I&O's Detail      LABS:                        15.3   9.37  )-----------( 186      ( 16 May 2023 15:18 )             44.8     05-16    140  |  104  |  15.1  ----------------------------<  101<H>  4.1   |  23.0  |  0.79    Ca    8.6      16 May 2023 15:18  Mg     2.2     05-16    TPro  7.9  /  Alb  3.7  /  TBili  0.9  /  DBili  x   /  AST  20  /  ALT  31  /  AlkPhos  96  05-16      CARDIAC MARKERS ( 16 May 2023 15:18 )  x     / 0.14 ng/mL / x     / x     / x      CARDIAC MARKERS ( 16 May 2023 13:35 )  x     / x     / 51 U/L / x     / 2.8 ng/mL      CAPILLARY BLOOD GLUCOSE    PT/INR - ( 16 May 2023 14:00 )   PT: 13.0 sec;   INR: 1.11 ratio     PTT - ( 16 May 2023 14:00 )  PTT:26.6 sec        Physical Examination:    General: No acute distress.      HEENT: NC/AT     PULM: Clear to auscultation bilaterally    CVS: Normal S1/S2, no murmurs, rubs, or gallops    ABD: Soft, nondistended, nontender, normoactive bowel sounds, no masses    EXT: No edema, mild tenderness to palpation of left lower extremity.     SKIN: Warm and well perfused, no rashes noted.    NEURO: Alert, oriented, interactive, nonfocal    RADIOLOGY: < from: CT Angio Chest PE Protocol w/ IV Cont (05.16.23 @ 13:02) >  IMPRESSION:    Saddle pulmonary embolus involving the central pulmonary arterial tree   and extending into all 5 lobes. CT findings of right heart strain   identified. Correlate with hemodynamic status and troponins. Dr. Morrissey   discussed these findings with Dr. Fleming from ER on 5/6/2023 at 1:09 PM,   with read back.    Aneurysmal mid ascending thoracic aorta measuring 4.3 cm and borderline   aneurysmal mid descending thoracic aorta measuring 3.1 cm. No acute   aortic pathology within the limitations of this non-EKG gated study.    Coronary artery calcifications.    --- End of Report ---    < from: CT Perfusion w/ Maps w/ IV Cont (05.16.23 @ 09:58) >  IMPRESSION:      1. Small chronic lacunar infarct right thalamus. No CT evidence of an   acute infarct or hemorrhage.  2. When allowing for partial volume averaging in the inferior left   frontal region, no perfusion abnormality or area of acute penumbra   suggested.  3. Widely patent head and neck vasculature.    Follow-up MR imaging of the brain recommended for further assessment.    < end of copied text >    < from: TTE Echo Complete w/o Contrast w/ Doppler (05.16.23 @ 15:19) >  Summary:   1. Left ventricular ejection fraction, by visual estimation, is 60 to   65%.   2. Normal global left ventricular systolic function.   3. Severely enlarged right ventricle. Severely reduced RV systolic   function. RV apex appears hyperkinetic, with free wall hypokinesis   suggestive of Dougherty's sign.   4. Mildly enlarged right atrium.   5. Normal left atrial size.   6. Moderate tricuspid regurgitation.   7. Estimated pulmonary artery systolic pressure is 43.3 mmHg assuming a   right atrial pressure of 8 mmHg, which is consistent with mild pulmonary   hypertension.   8. Mild mitral annular calcification.   9. Thickening of the anterior and posterior mitral valve leaflets.  10. Trace mitral valve regurgitation.  11. Sclerotic aortic valve with normal opening.  12. There is no evidence of pericardial effusion.  13. There are no prior studies on this patient for comparison purposes.    < end of copied text >        CRITICAL CARE TIME SPENT: ***   PULMONARY EMBOLISM RESPONSE TEAM (PERT) CONSULTATION    Patient is a 72y old  Male who presents with a chief complaint of Saddle PE (16 May 2023 16:51)      BRIEF HOSPITAL COURSE: Patient is a 71 y/o male with pmhx of HTN who presented to the ED with shortness of breath starting this morning. Patient states that about a month ago he was experiencing left calf pain and was sent for an U/S of the LLE. Patient was found to have a DVT and was being treated with Eliquis. Additionally patient was diagnosed with COVID 10 days ago and was prescribed molnupiravir. Patient reports having a bad reaction to molnupiravir in which he started to have bad dreams and nausea. Patient reports that he was not able to tolerate his PO medications and consequently stopped taking his eliquis. His last dose of eliquis was 5 days ago. Patient reports that he started to fell short of breath this morning while he was taking out the trash. He notes that suddenly felt extremely weak and like he was going to pass out. Patient's wife was home at the time and called an ambulance. Patient was initially taken to  [facility] and was transferred to [facility]. MICU was consulted for a saddle PE which was found on CTA. Patient was started on heparin drip in ED. Patient was evaluated at bedside and was sating well on room air and in no acute distress.      RISK STRATIFICATION FOR PULMONARY EMBOLISM:    sPESI (simplified pulmonary embolism severity index); The sPESI assigns one point for each of the following variables:   age >80 years  history of cancer   chronic cardiopulmonary disease   a heart rate =110 beats per minute   a systolic blood pressure <100 mmHg    an arterial oxyhemoglobin saturation <90 percent.    If sPESI score is zero, mortality 1%; if one or more, mortality 10%.      sPESI score for this patient: 0   RV:LV ratio: ***  Biomarkers (BNP/Troponin): Troponin increased from 442.8 to 917.6     FINAL RISK STRATIFICATION:                LOW RISK    PAST MEDICAL & SURGICAL HISTORY:  SVT (supraventricular tachycardia)  Acute deep vein thrombosis (DVT) of left lower extremity  2019 novel coronavirus disease (COVID-19)  HTN (hypertension)  S/P appendectomy    Allergies  morphine (Hives)  Intolerances    FAMILY HISTORY:  FH: prostate cancer (Father)    Review of Systems:  CONSTITUTIONAL: No fever, chills, or fatigue  EYES: No eye pain, visual disturbances, or discharge  ENMT:  No difficulty hearing, vertigo; No sinus or throat pain  NECK: No pain or stiffness  RESPIRATORY: No cough, wheezing, chills or hemoptysis; + shortness of breath  CARDIOVASCULAR: No chest pain, palpitations, dizziness, or leg swelling  GASTROINTESTINAL: No abdominal or epigastric pain. No nausea, vomiting, or hematemesis; No diarrhea or constipation. No melena or hematochezia.  GENITOURINARY: No dysuria, frequency, hematuria, or incontinence  NEUROLOGICAL: No headaches, memory loss, loss of strength, numbness, or tremors  SKIN: No itching, burning, rashes, or lesions   MUSCULOSKELETAL: No joint pain or swelling; No muscle, back, or extremity pain  PSYCHIATRIC: No depression, anxiety, mood swings, or difficulty sleeping    Medications:  heparin   Injectable 2500 Unit(s) IV Push every 6 hours PRN  heparin   Injectable 5500 Unit(s) IV Push every 6 hours PRN  heparin  Infusion.  Unit(s)/Hr IV Continuous <Continuous>    ICU Vital Signs Last 24 Hrs  T(C): 36.8 (16 May 2023 14:35), Max: 36.8 (16 May 2023 14:35)  T(F): 98.3 (16 May 2023 14:35), Max: 98.3 (16 May 2023 14:35)  HR: 96 (16 May 2023 16:32) (92 - 103)  BP: 153/103 (16 May 2023 16:32) (140/82 - 159/106)  BP(mean): --  ABP: --  ABP(mean): --  RR: 20 (16 May 2023 16:32) (16 - 20)  SpO2: 98% (16 May 2023 16:32) (95% - 100%)    O2 Parameters below as of 16 May 2023 16:32  Patient On (Oxygen Delivery Method): room air      Vital Signs Last 24 Hrs  T(C): 36.8 (16 May 2023 14:35), Max: 36.8 (16 May 2023 14:35)  T(F): 98.3 (16 May 2023 14:35), Max: 98.3 (16 May 2023 14:35)  HR: 96 (16 May 2023 16:32) (92 - 103)  BP: 153/103 (16 May 2023 16:32) (140/82 - 159/106)  BP(mean): --  RR: 20 (16 May 2023 16:32) (16 - 20)  SpO2: 98% (16 May 2023 16:32) (95% - 100%)    Parameters below as of 16 May 2023 16:32  Patient On (Oxygen Delivery Method): room air      I&O's Detail      LABS:                        15.3   9.37  )-----------( 186      ( 16 May 2023 15:18 )             44.8     05-16    140  |  104  |  15.1  ----------------------------<  101<H>  4.1   |  23.0  |  0.79    Ca    8.6      16 May 2023 15:18  Mg     2.2     05-16    TPro  7.9  /  Alb  3.7  /  TBili  0.9  /  DBili  x   /  AST  20  /  ALT  31  /  AlkPhos  96  05-16      CARDIAC MARKERS ( 16 May 2023 15:18 )  x     / 0.14 ng/mL / x     / x     / x      CARDIAC MARKERS ( 16 May 2023 13:35 )  x     / x     / 51 U/L / x     / 2.8 ng/mL      CAPILLARY BLOOD GLUCOSE    PT/INR - ( 16 May 2023 14:00 )   PT: 13.0 sec;   INR: 1.11 ratio     PTT - ( 16 May 2023 14:00 )  PTT:26.6 sec        Physical Examination:    General: No acute distress.      HEENT: NC/AT     PULM: Clear to auscultation bilaterally    CVS: Normal S1/S2, no murmurs, rubs, or gallops    ABD: Soft, nondistended, nontender, normoactive bowel sounds, no masses    EXT: No edema, mild tenderness to palpation of left lower extremity.     SKIN: Warm and well perfused, no rashes noted.    NEURO: Alert, oriented, interactive, nonfocal    RADIOLOGY: < from: CT Angio Chest PE Protocol w/ IV Cont (05.16.23 @ 13:02) >  IMPRESSION:    Saddle pulmonary embolus involving the central pulmonary arterial tree   and extending into all 5 lobes. CT findings of right heart strain   identified. Correlate with hemodynamic status and troponins. Dr. Morrissey   discussed these findings with Dr. Fleming from ER on 5/6/2023 at 1:09 PM,   with read back.    Aneurysmal mid ascending thoracic aorta measuring 4.3 cm and borderline   aneurysmal mid descending thoracic aorta measuring 3.1 cm. No acute   aortic pathology within the limitations of this non-EKG gated study.    Coronary artery calcifications.    --- End of Report ---    < from: CT Perfusion w/ Maps w/ IV Cont (05.16.23 @ 09:58) >  IMPRESSION:      1. Small chronic lacunar infarct right thalamus. No CT evidence of an   acute infarct or hemorrhage.  2. When allowing for partial volume averaging in the inferior left   frontal region, no perfusion abnormality or area of acute penumbra   suggested.  3. Widely patent head and neck vasculature.    Follow-up MR imaging of the brain recommended for further assessment.    < end of copied text >    < from: TTE Echo Complete w/o Contrast w/ Doppler (05.16.23 @ 15:19) >  Summary:   1. Left ventricular ejection fraction, by visual estimation, is 60 to   65%.   2. Normal global left ventricular systolic function.   3. Severely enlarged right ventricle. Severely reduced RV systolic   function. RV apex appears hyperkinetic, with free wall hypokinesis   suggestive of Dougherty's sign.   4. Mildly enlarged right atrium.   5. Normal left atrial size.   6. Moderate tricuspid regurgitation.   7. Estimated pulmonary artery systolic pressure is 43.3 mmHg assuming a   right atrial pressure of 8 mmHg, which is consistent with mild pulmonary   hypertension.   8. Mild mitral annular calcification.   9. Thickening of the anterior and posterior mitral valve leaflets.  10. Trace mitral valve regurgitation.  11. Sclerotic aortic valve with normal opening.  12. There is no evidence of pericardial effusion.  13. There are no prior studies on this patient for comparison purposes.    < end of copied text >        CRITICAL CARE TIME SPENT: ***   PULMONARY EMBOLISM RESPONSE TEAM (PERT) CONSULTATION    Patient is a 72y old  Male who presents with a chief complaint of Saddle PE (16 May 2023 16:51)      BRIEF HOSPITAL COURSE: 71 y/o male with PMHx HTN, h/o LE thrombophlebitis recently diagnosed with LLE DVT ~1 month ago stopped Eliquis 1 week ago, who presented to the ED with shortness of breath starting this morning. Patient states that about a month ago he was experiencing left calf pain and was sent for an U/S of the LLE. Patient was found to have a DVT and was being treated with Eliquis. Patient was diagnosed with COVID-19 10 days ago and was prescribed molnupiravir. Patient reports having significant side effects from molnupiravir in which he started to have insomnia and nausea. Patient reports that he was not able to tolerate his PO medications and consequently stopped taking his Eliquis. His last dose of Eliquis is estimated at 5 days ago. Patient reports that he started to feel short of breath this morning while he was taking out the trash. He notes that suddenly felt extremely weak with associated dizziness and near syncope. EMS was called and patient was initially taken to [facility], where stroke workup revealed old right thalamic infarct. CTA chest revealed saddle PE extending into all five lobes with associated RVstrain. He was started on Heparin gtt and transferred to [facility] for PERT evaluation.    At the time of evaluation, patient's HR sinus 90's, SpO2 98% on room air at rest with prolonged conversation.     Denies family history of blood clotting disorders, no personal history of cancer, non-smoker.   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 Denies history of hematuria.   Has a history of colonic polyps and undergoes regular surveillance colonoscopies, which have been negative       RISK STRATIFICATION FOR PULMONARY EMBOLISM:    sPESI:  age >80 years  history of cancer   chronic cardiopulmonary disease   a heart rate =110 beats per minute   a systolic blood pressure <100 mmHg    an arterial oxyhemoglobin saturation <90 percent.    If sPESI score is zero, mortality 1%; if one or more, mortality 10%.      sPESI score for this patient: 0  RV:LV ratio: n/a  Biomarkers (BNP/Troponin): Troponin 442.8 --> 917.6;   FINAL RISK STRATIFICATION: LOW RISK    GARRETT: 4pts intermediate risk    RIETE score: 1.0 intermediate risk of major bleeding        PAST MEDICAL & SURGICAL HISTORY:  SVT (supraventricular tachycardia)  Acute deep vein thrombosis (DVT) of left lower extremity  2019 novel coronavirus disease (COVID-19)  HTN (hypertension)  S/P appendectomy      Allergies  morphine (Hives)  Intolerances      FAMILY HISTORY:  FH: prostate cancer (Father)      Review of Systems:  CONSTITUTIONAL: No fever, chills, or fatigue  EYES: No eye pain, visual disturbances, or discharge  ENMT:  No difficulty hearing, vertigo; No sinus or throat pain  NECK: No pain or stiffness  RESPIRATORY: +shortness of breath. No cough, wheezing, chills or hemoptysis;   CARDIOVASCULAR: +dizziness. No chest pain, palpitations, or leg swelling  GASTROINTESTINAL: No abdominal or epigastric pain. No nausea, vomiting, or hematemesis; No diarrhea or constipation. No melena or hematochezia.  GENITOURINARY: No dysuria, frequency, hematuria, or incontinence  NEUROLOGICAL: No headaches, memory loss, loss of strength, numbness, or tremors  SKIN: No itching, burning, rashes, or lesions   MUSCULOSKELETAL: No joint pain or swelling; No muscle, back, or extremity pain  PSYCHIATRIC: No depression, anxiety, mood swings, or difficulty sleeping      Medications:  heparin   Injectable 2500 Unit(s) IV Push every 6 hours PRN  heparin   Injectable 5500 Unit(s) IV Push every 6 hours PRN  heparin  Infusion.  Unit(s)/Hr IV Continuous <Continuous>        ICU Vital Signs Last 24 Hrs  T(C): 36.8 (16 May 2023 14:35), Max: 36.8 (16 May 2023 14:35)  T(F): 98.3 (16 May 2023 14:35), Max: 98.3 (16 May 2023 14:35)  HR: 96 (16 May 2023 16:32) (92 - 103)  BP: 153/103 (16 May 2023 16:32) (140/82 - 159/106)  BP(mean): --  ABP: --  ABP(mean): --  RR: 20 (16 May 2023 16:32) (16 - 20)  SpO2: 98% (16 May 2023 16:32) (95% - 100%)    O2 Parameters below as of 16 May 2023 16:32  Patient On (Oxygen Delivery Method): room air      Vital Signs Last 24 Hrs  T(C): 36.8 (16 May 2023 14:35), Max: 36.8 (16 May 2023 14:35)  T(F): 98.3 (16 May 2023 14:35), Max: 98.3 (16 May 2023 14:35)  HR: 96 (16 May 2023 16:32) (92 - 103)  BP: 153/103 (16 May 2023 16:32) (140/82 - 159/106)  BP(mean): --  RR: 20 (16 May 2023 16:32) (16 - 20)  SpO2: 98% (16 May 2023 16:32) (95% - 100%)    Parameters below as of 16 May 2023 16:32  Patient On (Oxygen Delivery Method): room air        I&O's Detail      LABS:                        15.3   9.37  )-----------( 186      ( 16 May 2023 15:18 )             44.8     05-16    140  |  104  |  15.1  ----------------------------<  101<H>  4.1   |  23.0  |  0.79    Ca    8.6      16 May 2023 15:18  Mg     2.2     05-16    TPro  7.9  /  Alb  3.7  /  TBili  0.9  /  DBili  x   /  AST  20  /  ALT  31  /  AlkPhos  96  05-16      CARDIAC MARKERS ( 16 May 2023 15:18 )  x     / 0.14 ng/mL / x     / x     / x      CARDIAC MARKERS ( 16 May 2023 13:35 )  x     / x     / 51 U/L / x     / 2.8 ng/mL      CAPILLARY BLOOD GLUCOSE    PT/INR - ( 16 May 2023 14:00 )   PT: 13.0 sec;   INR: 1.11 ratio     PTT - ( 16 May 2023 14:00 )  PTT:26.6 sec        Physical Examination:    General: No acute distress.      HEENT: NC/AT     PULM: Clear to auscultation bilaterally    CVS: Normal S1/S2, no murmurs    ABD: Soft, nondistended, nontender, normoactive bowel sounds, no masses    EXT: No edema, mild tenderness to palpation of left calf, Justine's negative    SKIN: Warm and well perfused, no rashes noted.    NEURO: Alert, oriented, interactive, nonfocal        RADIOLOGY: < from: CT Angio Chest PE Protocol w/ IV Cont (23 @ 13:02) >  IMPRESSION:    Saddle pulmonary embolus involving the central pulmonary arterial tree   and extending into all 5 lobes. CT findings of right heart strain   identified. Correlate with hemodynamic status and troponins. Dr. Morrissey   discussed these findings with Dr. Fleming from ER on 2023 at 1:09 PM,   with read back.    Aneurysmal mid ascending thoracic aorta measuring 4.3 cm and borderline   aneurysmal mid descending thoracic aorta measuring 3.1 cm. No acute   aortic pathology within the limitations of this non-EKG gated study.    Coronary artery calcifications.          < from: CT Perfusion w/ Maps w/ IV Cont (23 @ 09:58) >  IMPRESSION:      1. Small chronic lacunar infarct right thalamus. No CT evidence of an   acute infarct or hemorrhage.  2. When allowing for partial volume averaging in the inferior left   frontal region, no perfusion abnormality or area of acute penumbra   suggested.  3. Widely patent head and neck vasculature.    Follow-up MR imaging of the brain recommended for further assessment.          < from: TTE Echo Complete w/o Contrast w/ Doppler (23 @ 15:19) >  Summary:   1. Left ventricular ejection fraction, by visual estimation, is 60 to   65%.   2. Normal global left ventricular systolic function.   3. Severely enlarged right ventricle. Severely reduced RV systolic   function. RV apex appears hyperkinetic, with free wall hypokinesis   suggestive of Dougherty's sign.   4. Mildly enlarged right atrium.   5. Normal left atrial size.   6. Moderate tricuspid regurgitation.   7. Estimated pulmonary artery systolic pressure is 43.3 mmHg assuming a   right atrial pressure of 8 mmHg, which is consistent with mild pulmonary   hypertension.   8. Mild mitral annular calcification.   9. Thickening of the anterior and posterior mitral valve leaflets.  10. Trace mitral valve regurgitation.  11. Sclerotic aortic valve with normal opening.  12. There is no evidence of pericardial effusion.  13. There are no prior studies on this patient for comparison purposes.

## 2023-05-16 NOTE — PATIENT PROFILE ADULT - FALL HARM RISK - HARM RISK INTERVENTIONS

## 2023-05-16 NOTE — H&P ADULT - HISTORY OF PRESENT ILLNESS
73 yo Male pt. who is  transferred from [facility] for saddle PE. Pt states that in early april he had some lt. calf pain and went for outpatient US and was found to have a DVT. Pt states that his doctor started him on eliquis 2.5 mg BID. Pt states that 10 d ago he was diagnosed with covid , was tested for viral illness like symptoms and was started on molnupiravir. Pt states that he had a bad reaction to the molnupiravir and had weakness, bad dreams and nausea. Pt states that he was not really able to tolerate po and stopped the eliquis and molnupiravir 5 days ago. Today he went to take trash to the curb when he suddenly felt weak, sob and felt like he as going to pass out. Pt went into the house and still felt poorly so his wife called an ambulance. Pt currently feels fine if he isn't doing anything. 73 yo Male pt. who is  transferred from [facility] for saddle PE. Pt states that in early april he had some lt. calf pain and went for outpatient US and was found to have a DVT. Pt states that his doctor started him on eliquis 2.5 mg BID. Pt states that 10 d ago he was diagnosed with covid , was tested for viral illness like symptoms and was started on molnupiravir. Pt states that he had a bad reaction to the molnupiravir and had weakness, bad dreams and nausea. Pt states that he was not really able to tolerate po and stopped the eliquis and molnupiravir 5 days ago. Today he went to take trash to the curb when he suddenly felt weak, sob and felt like he as going to pass out. Pt went into the house and still felt poorly so his wife called an ambulance. Pt. is on iv heparin , seen by MICU and cardiology team and plan is to do thrombectomy in am.  73 yo Male pt. who is  transferred from [facility] for saddle PE. Pt states that in early april he had some lt. calf pain and went for outpatient US and was found to have a DVT. Pt states that his doctor started him on eliquis 2.5 mg BID. Pt states that 10 d ago he was diagnosed with covid , was tested for viral illness like symptoms and was started on molnupiravir. Pt states that he had a bad reaction to the molnupiravir and had weakness, bad dreams and nausea. Pt states that he was not really able to tolerate po and stopped the eliquis and molnupiravir 5 days ago. Today he went to take trash to the curb when he suddenly felt weak, sob and felt like he as going to pass out. Pt went into the house and still felt poorly so his wife called an ambulance. Pt. is on iv heparin , seen by MICU and cardiology team and plan is to do thrombectomy in am. no cp, sob , no hypoxia at the time of admission.  71 yo Male pt. who is  transferred from [facility] for saddle PE. Pt states that in early april he had some lt. calf pain ( thought pulled muscle ) and went for outpatient US and was found to have a DVT. Pt states that his doctor started him on eliquis 2.5 mg BID. Pt states that 10 days ago he was diagnosed with covid , was tested for viral illness like symptoms and was started on molnupiravir. Pt states that he had a bad reaction to the molnupiravir and had weakness, bad dreams and nausea. Pt states that he was not really able to tolerate po and stopped the eliquis and molnupiravir 5 days ago. Today he went to take trash to the curb when he suddenly felt weak, sob and felt like he as going to pass out. Pt went into the house and still felt poorly so his wife called an ambulance. Pt. is on iv heparin , seen by MICU and cardiology team and plan is to do thrombectomy in am. no cp, sob , no hypoxia at the time of admission. covid 19 positive.

## 2023-05-16 NOTE — ED ADULT NURSE NOTE - CHIEF COMPLAINT QUOTE
transfer from [place] for saddle PE, originally sob. denies cp denies sob. pt directed to critical care and heparin infusing at 14 ml/hr

## 2023-05-16 NOTE — H&P ADULT - ASSESSMENT
71 yo Male pt. who is  transferred from [facility] for saddle PE. pt. had LLE dvt about 6 weeks ago, covid 19  positive. pt. will be admitted to step down. pt. on iv heparin.

## 2023-05-16 NOTE — CONSULT NOTE ADULT - SUBJECTIVE AND OBJECTIVE BOX
[facility] - Cardiology Team Note                                                              [facility]                                                         Office:  [address]                                                                     Telephone: [phone]. Fax:[phone]                                                                 [facility] CARDIOLOGY CONSULTATION NOTE                                                                                             Consult requested by:  Dr. Alfonso   Reason for Consultation: Positive pulmonary embolism   History obtained by: Patient and medical record   obtained: No  Hospital Transferred from:  [facility]   On call accepting attending (if transferred): Dr. Man Crespo     CC:  Patient came in with complaints of shortness of breath.     HPI:  This is a 71 y/o male with hx of varicose veins and HTN presenting to [facility] with complaints of shortness of breath. He was taking the trash out to the curb today and felt weak, SOB, and felt as if he was going to pass out. He went into the house and still felt weak and his wife called an ambulance. Patient feels no symptoms and fine if he isn't doing anything. He had COVID 10 days ago. He was taking Eliquis 2.5 mg BID for superficial GSV thrombosis and below the knee gastrocnemius DVT. He stopped taking Eliquis 4-5 days ago while he had COVID. He was on covid medication called Lagqasim and stopped taking the medication 2 days after initially starting it due to the side effects of insomnia, weakness, nausea, and vivid dreams. Patient was supposed to get a repeat ultrasound of his legs but did not go due to recent COVID. Patient follows up with Dr. Mckeon in the office.   Patient denies fevers, chills, chest pain, palpitations, abdominal pain, n/v. Patient has no hx of PPM, stents, MI, arrhythmia CHF. Patient is transferred from [facility] for saddle PE.     DO NOT COPY FORWARD HPI     Risk Factors:  Prior hx of VTE x   Cancer screening: Colonoscopy [x] Mammogram [] Papsmear [] PSA [x]      ALLERGIES: Allergies    morphine (Hives)    Intolerances          PAST MEDICAL HISTORY      PAST SURGICAL HISTORY      FAMILY HISTORY:      SOCIAL HISTORY:  Denies smoking/alcohol/drugs  CIGARETTES:     ALCOHOL:  DRUGS:                                        CURRENT MEDICATIONS:     heparin  Infusion.        HOME MEDICATIONS:  Vital Signs Last 24 Hrs  T(C): 36.8 (16 May 2023 14:35), Max: 36.8 (16 May 2023 14:35)  T(F): 98.3 (16 May 2023 14:35), Max: 98.3 (16 May 2023 14:35)  HR: 96 (16 May 2023 16:32) (92 - 103)  BP: 153/103 (16 May 2023 16:32) (140/82 - 159/106)  BP(mean): --  RR: 20 (16 May 2023 16:32) (16 - 20)  SpO2: 98% (16 May 2023 16:32) (95% - 100%)    Parameters below as of 16 May 2023 16:32  Patient On (Oxygen Delivery Method): room air          Review of Systems    [ x] All others negative	  [ ] Unable to obtain    CONSTITUTIONAL: No fever, weight loss, or fatigue  EYES: No eye pain, visual disturbances, or discharge  ENT:  No difficulty hearing, tinnitus, vertigo; No sinus or throat pain  NECK: No pain or stiffness  RESPIRATORY:  + SOB   CARDIOVASCULAR:  No chest pain, palpitations.   GASTROINTESTINAL: No abdominal or epigastric pain. No nausea, vomiting, or hematemesis; No diarrhea or constipation. No melena or hematochezia.  GENITOURINARY: No dysuria, frequency, hematuria, or incontinence  NEUROLOGICAL: No headaches, memory loss, loss of strength, numbness, or tremors  SKIN:   LYMPH Nodes: No enlarged glands  ENDOCRINE: No heat or cold intolerance; No hair loss  MUSCULOSKELETAL: No joint pain or swelling; No muscle, back, or extremity pain  PSYCHIATRIC: No depression, anxiety, mood swings, or difficulty sleeping  HEME/LYMPH: No easy bruising, or bleeding gums  ALLERGY AND IMMUNOLOGIC: No hives or eczema    PHYSICAL EXAM:  Vital Signs Last 24 Hrs  T(C): 36.8 (16 May 2023 14:35), Max: 36.8 (16 May 2023 14:35)  T(F): 98.3 (16 May 2023 14:35), Max: 98.3 (16 May 2023 14:35)  HR: 96 (16 May 2023 16:32) (92 - 103)  BP: 153/103 (16 May 2023 16:32) (140/82 - 159/106)  BP(mean): --  RR: 20 (16 May 2023 16:32) (16 - 20)  SpO2: 98% (16 May 2023 16:32) (95% - 100%)    Parameters below as of 16 May 2023 16:32  Patient On (Oxygen Delivery Method): room air        Constitutional: Comfortable . No acute distress.   HEENT: Atraumatic and normocephalic , neck is supple . no JVD. No carotid bruit. PEERL   CNS: A&Ox3. No focal deficits. EOMI. Cranial nerves II-IX are intact.   Lymph Nodes: Cervical : Not palpable.  Respiratory: CTAB  Cardiovascular: S1S2 RRR. No murmur/rubs or gallop.  Gastrointestinal: Soft non-tender and non distended . +Bowel sounds. negative Lagunas's sign.  Extremities: No edema.   Psychiatric: Calm . no agitation.  Skin: No skin rash/ulcers visualized to face, hands or feet.    Vascular Pulse Exam:  Right DP: [x]palpable []non-palpable []audible      Left DP :   [x]palpable []non-palpable []audible  Right PT: [x]palpable [] non-palpable []audible   Left PT:  [x] palpable [] non-palpable []audible         Foot Exam:  no edema       Intake and output:     LABS:                        15.3   9.37  )-----------( 186      ( 16 May 2023 15:18 )             44.8     05-16    140  |  104  |  15.1  ----------------------------<  101<H>  4.1   |  23.0  |  0.79    Ca    8.6      16 May 2023 15:18  Mg     2.2     05-16    TPro  7.9  /  Alb  3.7  /  TBili  0.9  /  DBili  x   /  AST  20  /  ALT  31  /  AlkPhos  96  05-16    CARDIAC MARKERS ( 16 May 2023 15:18 )  x     / 0.14 ng/mL / x     / x     / x      CARDIAC MARKERS ( 16 May 2023 13:35 )  x     / x     / 51 U/L / x     / 2.8 ng/mL    ;p-BNP=   PT/INR - ( 16 May 2023 14:00 )   PT: 13.0 sec;   INR: 1.11 ratio         PTT - ( 16 May 2023 14:00 )  PTT:26.6 sec      INTERPRETATION OF TELEMETRY: Reviewed by me.   ECG: Reviewed by me.     PREVIOUS DIAGNOSTIC TESTING  ECHO FINDINGS: < from: TTE Echo Complete w/o Contrast w/ Doppler (05.16.23 @ 15:19) >   1. Left ventricular ejection fraction, by visual estimation, is 60 to   65%.   2. Normal global left ventricular systolic function.   3. Severely enlarged right ventricle. Severely reduced RV systolic   function. RV apex appears hyperkinetic, with free wall hypokinesis   suggestive of Dougherty's sign.   4. Mildly enlarged right atrium.   5. Normal left atrial size.   6. Moderate tricuspid regurgitation.   7. Estimated pulmonary artery systolic pressure is 43.3 mmHg assuming a   right atrial pressure of 8 mmHg, which is consistent with mild pulmonary   hypertension.   8. Mild mitral annular calcification.   9. Thickening of the anterior and posterior mitral valve leaflets.  10. Trace mitral valve regurgitation.  11. Sclerotic aortic valve with normal opening.  12. There is no evidence of pericardial effusion.  13. There are no prior studies on this patient for comparison purposes.      < end of copied text >        STRESS FINDINGS:      CATHETERIZATION FINDINGS:       US DUPLEX:       CTA OF THE CHEST:   < from: CT Angio Chest PE Protocol w/ IV Cont (05.16.23 @ 13:02) >    Saddle pulmonary embolus involving the central pulmonary arterial tree   and extending into all 5 lobes. CT findings of right heart strain   identified. Correlate with hemodynamic status and troponins. Dr. Morrissey   discussed these findings with Dr. Fleming from ER on 5/6/2023 at 1:09 PM,   with read back.    Aneurysmal mid ascending thoracic aorta measuring 4.3 cm and borderline   aneurysmal mid descending thoracic aorta measuring 3.1 cm. No acute   aortic pathology within the limitations of this non-EKG gated study.    Coronary artery calcifications.    < end of copied text >      RADIOLOGY & ADDITIONAL STUDIES:    X-ray:  reviewed by me.

## 2023-05-16 NOTE — CONSULT NOTE ADULT - NS PANP COMMENT GEN_ALL_CORE FT
Assessment & Plan:     72-year-old  male with past medical history of essential hypertension nephrolithiasis appendectomy superficial DVT with recent COVID treated with molnupiravir with labs and Eliquis for 7 days presented with presyncopal episode was found to have submassive PE being evaluated by PERT team  Tachycardia improved once started on heparin infusion with no symptoms at rest    Troponin: 442->  917  ProBNP: 267  Transthoracic: RVSP/PASP: 43.3 mmHg; The right ventricular size is severely enlarged. RV   systolic function is severely reduced. RV apex appears hyperkinetic, with   free wall hypokinesis suggestive of Dougherty's sign.    US duplex: pending  sPESI score:  with 8.9 % risk     Risk Factors: sup VTE; COVID 19  Family Hx of Thrombophilia none  Recent long haul travel none  Immobility for one week  Prior hx of VTE supf  Cancer screening: Colonoscopy [x] PSA elevated with normal scan       Treatment recommendations:   patient about his advance directive and he happens to be Anglican as would recommend against half dose tPA for now and recommend continuing heparin infusion and agree with cardiology about mechanical thrombectomy and minimally invasive procedure which patient and his family seems to be very acceptable off and would recommend follow-up transthoracic echocardiogram     plan discussed with ED team, cardiology team patient and his daughter at bedside    Thank you for your consult.   Please call us back with any worsening clinical status or with concerns & questions.   We will Sign Off for now.     Martell Levine MD   Division of Critical Care Medicine  Department of Medicine   [facility]   Cell [phone]

## 2023-05-16 NOTE — ED ADULT NURSE NOTE - OBJECTIVE STATEMENT
72 yr M pt BIBEMS from home. pt reports feeling chest discomfort, dizziness/lightheadness while picking weeds in yard, pt states  "felt like my life being taken away," sx improved then returned after walking up stairs. pt reports hx anxiety but never felt the sensation he felt today before. COVID positive diagnosis 10 days ago, pt denies feeling SOB/CP prior to today. pt denies active CP/palpitations, SOB, dizziness, blurry vision, N/V/D. EKG performed at bedside already, pt placed on CM, HR , 98% on 2L NC. IV placed #20 rt AC, labs sent. pt pending CT.

## 2023-05-16 NOTE — H&P ADULT - NSHPPHYSICALEXAM_GEN_ALL_CORE
Vital Signs Last 24 Hrs  T(C): 36.8 (16 May 2023 14:35), Max: 36.8 (16 May 2023 14:35)  T(F): 98.3 (16 May 2023 14:35), Max: 98.3 (16 May 2023 14:35)  HR: 96 (16 May 2023 16:32) (92 - 103)  BP: 153/103 (16 May 2023 16:32) (140/82 - 159/106)  BP(mean): --  RR: 20 (16 May 2023 16:32) (16 - 20)  SpO2: 98% (16 May 2023 16:32) (95% - 100%)    Parameters below as of 16 May 2023 16:32  Patient On (Oxygen Delivery Method): room air    General: pt. in bed not in distress  eyes : PERRL. intact EOM.   HENT: AT, NC. no throat erythema or exudate.   Neck: supple. no JVD.   Chest: air entry fair  bilaterally, no inter costal retractions.  Heart: S1,S2. RRR. no heart murmur. no edema  Abdomen: soft. non-tender. non-distended. + BS.   Ext: no calf tenderness appreciated. ROM of asll ext. intact  vascular : distal pulses 2 +  Neuro: AAO x3. no focal weakness. no speech disorder, cns ii to xii intact  Skin: warm and dry  psych : mood ok, no si/hi

## 2023-05-16 NOTE — ED PROVIDER NOTE - ENMT, MLM
Airway patent, Nasal mucosa clear. Mouth with normal mucosa. Throat has no vesicles, no oropharyngeal exudates and uvula is midline. no g f r, enlarged tonsils no exudate or erythema no stridor

## 2023-05-16 NOTE — H&P ADULT - NSICDXPASTMEDICALHX_GEN_ALL_CORE_FT
PAST MEDICAL HISTORY:  2019 novel coronavirus disease (COVID-19)     Acute deep vein thrombosis (DVT) of left lower extremity     HTN (hypertension)     SVT (supraventricular tachycardia)

## 2023-05-16 NOTE — ED PROVIDER NOTE - NSBENEFITOFTRANSFER_ED_A_ED
Obtain Level of Care/Service Not Available at this Facility/Worsening of Condition, Death, or Disability if Patient Does Not Transfer/Other:

## 2023-05-16 NOTE — ED ADULT TRIAGE NOTE - CHIEF COMPLAINT QUOTE
transfer from [place] for saddle PE, originally sob. denies cp denies sob. pt directed to critical care and heparin infusing at 14 ml/hr

## 2023-05-16 NOTE — ED PROVIDER NOTE - CLINICAL SUMMARY MEDICAL DECISION MAKING FREE TEXT BOX
72-year-old male with a history of hypertension DVT on Eliquis presents with sudden onset of dyspnea chest pain lightheadedness.  Rule out CVA, rule out intracerebral process on blood thinners, rule out ACS patient has a heart murmur and is tachycardic.  With a pericardial effusion or other cardiac abnormality.  There is no evidence of NSTEMI on EKG, but there are Q waves.  We will trend enzymes, cardiology consultation, CT imaging of the head, electrolytes, CBC, chest x-ray, continuous cardiac monitoring given his recent COVID history.  We will test for COVID again.  Orthostasis, and disposition accordingly.  This chart was made with dictation software and may contain typographical errors.

## 2023-05-16 NOTE — ED ADULT NURSE NOTE - NSFALLHARMRISKINTERV_ED_ALL_ED

## 2023-05-16 NOTE — H&P ADULT - PROBLEM SELECTOR PLAN 1
admit to step down  iv heparin  cardiology team plans to do thrombectomy in the am  rv strain on echo  micu evaluated pt as well.   follow lower ext. venous doppler  pt. hemodynamically stable, no hypoxia.

## 2023-05-16 NOTE — CONSULT NOTE ADULT - NS ATTEND AMEND GEN_ALL_CORE FT
Patient seen and examined at bedside and is hemodynamically stable   Patient follows with Vascular medicine for hx of varicose veins with superficial GSV thrombosis and below the knee gastrocnemius DVT, patient started on Eliquis 2.5 mg BID.  Diagnosed with COVID 10 days ago and notes that he stopped Eliquis for approx 4-5 days and then had subsequent events leading to admission today   Patient notes that prior to admission to [facility] had 2 episodes of near syncope with no syncopal episodes with some exertional shortness of breath   HS trops elevated 442->917 at [facility] with Trops here 0.14 and serum probnp   COVID +   TTE: severely dilated severely reduced RV function and size with evidence of McConnells sign with normal LVEF 60-65%   sPESI: 0    Social Hx: Colonoscopy 1-2 yrs ago (Normal); PSA ( follows with urology and elevated PSA 5 had CT scan which was told was normal and to follow up in 6 months)     Intermediate high risk saddle PE with acute cor pulmonale, provoked in the setting of COVID infection and hx of L gastrocnemius DVT   - continue with Hep ggt   - overall clinically hemodynamically stable with no need for supplemental oxygen; + trops with severely reduced severely dilated RV, and based on thrombus burden would ideally be a candidate for mechanical thrombectomy in the AM   - recommend to obtain US venous duplex to assess thrombus burden in bilateral lower extremities   - trend trops, obtain Lactate level   - discussed with wife that varicosities are a risk factor for thrombosis and that this event was more likely to be provoked in the setting of COVID and being off half dose AC; would still like to check for genetic causes for thrombosis but discussed other factors not check protein C, protein S, antithrombin III and lupus anticoagulant as they are inaccurate during acute VTE and while the patient is on anticoagulation will need eval for Factor V Leiden, Prothrombin gene mutation, MTHFR  beta 2 glycoprotein, anticardiolipin antibody    Felipa Thomas D.O. [facility]  Cardiology/Vascular Cardiology -[facility] Cardiology   Telephone # [phone]

## 2023-05-16 NOTE — ED ADULT NURSE REASSESSMENT NOTE - NS ED NURSE REASSESS COMMENT FT1
Assumed care of pt from Rishi CHANDLER. Pt A&Ox4, NAD. Breathing even and unlabored. Pt on heparin gtt infusing at 12ml/hr.

## 2023-05-16 NOTE — ED PROVIDER NOTE - OBJECTIVE STATEMENT
Patient is a 72-year-old male with a history of DVT on Eliquis twice a day, and hypertension.  He also endorses a remote history of anxiety.  He has a recent diagnosis of COVID 10 days ago.  Was on COVID medications that he cannot recall the name of-not Paxlovid.  Primary care physician is Dr. Rojas.  Not a smoker or drinker.  Denies any significant medical history but takes blood pressure medication.  Presented by ambulance this morning after 2 episodes of dyspnea with chest pressure and lightheadedness.  Symptoms began when he was outside picking up weeds from the grass.  He felt unwell, short of breath diaphoretic with chest discomfort.  Symptoms resolved with rest.  And then resumes spontaneously.  EMS was called.  Transported to the hospital.  He denies current chest pressure or chest pain.  Denies any shortness of breath or fever.  He states he feels much better.  Denies any calf pain or calf swelling.  Stating his DVT has resolved.  No history of recent symptomatology.  No history of MI.  Does not follow with a cardiologist.

## 2023-05-16 NOTE — ED PROVIDER NOTE - CLINICAL SUMMARY MEDICAL DECISION MAKING FREE TEXT BOX
labs and imaging from [facility] reviewed. pt continued on heparin gtt.  cards and micu consulted and recommended stepdown.  TTE done.  Pt comfortably and vitally stable.  case d/w dr. kimble for admission.

## 2023-05-16 NOTE — ED ADULT NURSE NOTE - OBJECTIVE STATEMENT
Patient arrived to ED today from [facility] after he arrived there due to two episodes of SOB, chest discomfort, dizziness, nausea, and diaphoresis after he was taking out the garbage. Patient found to have multiple PE's, saddle PE with right heart strain.  Patient received in room critical care 5, on room air, Spo2 is 98%, in NSR in the 70's, Heparin infusing at 1200 units an hour, awake and alert x4.

## 2023-05-16 NOTE — CONSULT NOTE ADULT - ASSESSMENT
Assessment: This is 73 y/o male with hx of varicose veins and HTN presenting to [facility] with complaints of shortness of breath. He was taking the trash out to the curb today and felt weak, SOB, and felt as if he was going to pass out. He went into the house and still felt weak; his wife called an ambulance. Patient feels no symptoms if he isn't doing anything. He had COVID 10 days ago. He was taking Eliquis 2.5 mg BID for superficial GSV thrombosis and below the knee gastrocnemius DVT. He stopped taking Eliquis 4-5 days ago while he had COVID. Patient is transferred from [facility] for saddle PE. Of note, patient is a Anabaptist.     Troponin: ( 16 May 2023 15:18 )  Troponin T  0.14<H>,  CPK  X    , CKMB  X    , BNP X        , ( 16 May 2023 13:35 )  Troponin T  X    ,  CPK  51   , CKMB  X    , BNP X        ProBNP: 267   Transthoracic: [RV function and PASP] EF 60 to 65%. Severely enlarged right ventricle. Severely reduced RV systolic   function. RV apex appears hyperkinetic, with free wall hypokinesis suggestive of Dougherty's sign.    US duplex: pending   sPESI score:

## 2023-05-16 NOTE — CONSULT NOTE ADULT - ASSESSMENT
72-year-old male with a history of DVT on Eliquis twice a day, hypertension presents w/ SOB. Cardiology consulted for dyspnea.    #Dyspnea  - ECG: Sinus tachycardia, VR 112bpm  - Patient is currently symptomatic denies SOB although on 2L NC  - Patient needs CT angio chest to evaluate PE in setting of recent DVT (not taking Eliquis for few days) and recent covid infection  - Obtain TTE, f/u results   - Continue to monitor hemodynamics     #Elevated troponins  - ECG: Sinus tachycardia, VR 112bpm  - Troponin of 442.8 on admission, TTP   - Patient is not complaining of any cardiac symptoms at this time  - Likely demand ischemia in setting of acute infection  - Monitor closely for the development of anginal symptoms or clinical signs of ischemia.     - Other cardiovascular workup will depend on clinical course.  - All other workup per primary team.  - Will continue to follow.

## 2023-05-16 NOTE — ED ADULT TRIAGE NOTE - SOURCE OF INFORMATION
Metformin not yet given to patient. Called pharmacy to see why not verified yet. Waiting on Lab  for blood draw. Patient given tobacco quitline number 23734750275 at this time, refusing to call at this time, states \" I just dont want to quit now\"- patient given information as to the dangers of long term tobacco use. Continue to reinforce the importance of tobacco cessation. Pt was encouraged to attend evening wrap up group at 8:30pm-9:00pm and relaxation group at 9:00pm-9:15pm but pt declined. Will continue to encourage pt to attend groups. Pt was encouraged to attend evening wrap up group at 8:30pm-9:00pm and relaxation group at 9:00pm-9:15pm but pt declined. Will continue to encourage pt to attend groups. Suicidal Ideation: Yes  Present Homicidal Ideation: Yes    Tobacco Screening:  Practical Counseling, on admission, clayton X, if applicable and completed (first 3 are required if patient doesn't refuse):            ( )  Recognizing danger situations (included triggers and roadblocks)                    ( )  Coping skills (new ways to manage stress, exercise, relaxation techniques, changing routine, distraction)                                                           ( )  Basic information about quitting (benefits of quitting, techniques in how to quit, available resources  ( ) Referral for counseling faxed to [facility]                                           (X  ) Patient refused counseling  ( ) Patient has not smoked in the last 30 days      Pt admitted with followings belongings:  Dentures: None  Vision - Corrective Lenses: None  Hearing Aid: None  Jewelry: Necklace, Bracelet  Body Piercings Removed: N/A  Clothing: Socks, Footwear, Jacket / coat, Pants, Shirt  Were All Patient Medications Collected?: Yes  Other Valuables: Purse ([place] ID card)     Valuables sent home with ( N/A)  Valuables placed in safe in security envelope, numbers : [id number], [id number], [id number], [id number],  Patient's home medications were Verified with her Pharmacy. Patient oriented to surroundings and program expectations and copy of patient rights given. Received admission packet:  Yes. Consents reviewed, signed No, patient was in crisis and requested not to sign any other documents other than the Voluntary Admission.  . .                     Patient verbalize understanding:  No , patient was in crisis    Patient education on precautions: given                  Trevor Becerra RN Patient

## 2023-05-16 NOTE — CONSULT NOTE ADULT - PROBLEM SELECTOR RECOMMENDATION 9
- patient presented to [facility] with SOB and weakness then transferred to [facility] for saddle PE noted on CTA Chest.   - currently on room air. patient is hemodynamically stable.   - c/w heparin drip for anticoagulation. Of note, patient is a Congregational.   - TTE shows EF 60 to 65%. Severely enlarged right ventricle. Severely reduced RV systolic   function. RV apex appears hyperkinetic, with free wall hypokinesis suggestive of Dougherty's sign.   - Troponin elevated, 0.14. continue to trend troponins.   - EKG: NSR/ST  - monitor on telemetry.   - lactate pending to assess for perfusion   - ProBNP: 267  - B/L LE dopplers pending to evaluate for DVTs   - Plan for thrombectomy tomorrow. NPO after midnight

## 2023-05-16 NOTE — ED ADULT TRIAGE NOTE - CHIEF COMPLAINT QUOTE
Patient A/Ox4 with clear speech. BIBA from home, Covid + x7 days. Did not finish course of home PO antiviral 2/2 adverse reaction. Became dizzy and SOB while taking out trash this AM. 2L NC applied by EMS.

## 2023-05-16 NOTE — ED PROVIDER NOTE - OBJECTIVE STATEMENT
Pt is a 73 yo M co shortness of breath.  Pt was transferred from [facility] for saddle PE. Pt states that in early april he had some calf pain and went for outpatient US and was found to have a DVT. Pt states that his doctor started him on eliquis 2.5 mg BID. Pt states that 10 d ago he was diagnosed with covid and was started on molnupiravir. Pt states that he had a bad reaction to the molnupiravir and had weakness, bad dreams and nausea. Pt states that he was not really able to tolerate po and stopped the eliquis and molnupiravir 5 d ago. Today he went to take trash to the curb when he suddenly felt weak, sob and felt like he as going to pass out. Pt went into the house and still felt poorly so his wife called an ambulance. Pt currently feels fine if he isn't doing anything.

## 2023-05-16 NOTE — CONSULT NOTE ADULT - ASSESSMENT
Assessment/Plan   1. Submassive pulmonary embolism   -CTA revealed saddle PE extending from the central pulmonary artery tree to all 5 lobes  -Sinus tachycardic on EKG    -TTE revealed EF at 60-65% with severe dilation of right ventricle. Severely reduced RV systolic function noted with a positive Dougherty's sign   -Troponin rising, continue to trend cardiac enzymes   -sPESI score for this patient is 0  -Patient states his symptoms have resolved and endorses no complaints at this time   -Heparin drip started in the ED  -Patient reports that he is a [employer]. Patient and family were educated on risks of bleeding with fibrinolytic therapy   -Patient to be admitted to stepdown unit for continuous tele monitoring  -Cardiology consulted and plan is for a thrombectomy tomorrow   -NPO after midnight     2. COVID   -Diagnosed 10 days ago   -Started on molnupiravir and developed bad reactions such as weakness, vivid dreams and nausea.             Assessment/Plan   1. Submassive pulmonary embolism   -CTA revealed saddle PE extending from the central pulmonary artery tree to all 5 lobes  -Sinus tachycardic on EKG    -TTE revealed EF at 60-65% with severe dilation of right ventricle. Severely reduced RV systolic function noted with a positive Dougherty's sign   -Troponin rising, continue to trend cardiac enzymes and lactate    -sPESI score for this patient is 0  -Patient states his symptoms have resolved and endorses no complaints at this time   -Heparin drip started in the ED  -Patient reports that he is a Religious. Patient and family were educated on risks of bleeding with fibrinolytic therapy   -Patient to be admitted to stepdown unit for continuous tele monitoring  -Cardiology consulted and plan is for a thrombectomy tomorrow   -NPO after midnight     2. COVID   -Afebrile, wbc normal with no left shift   -Diagnosed 10 days ago, no active signs of infection    -Started on molnupiravir and developed bad reactions such as weakness, vivid dreams and nausea  -Missed dose of eliquis due to side effect of antiviral medication               71 y/o male with PMHx HTN, h/o LE thrombophlebitis recently diagnosed with LLE DVT ~1 month ago stopped Eliquis 1 week ago due to recent COVID-19 infection, who presented with shortness of breath, dizziness, and near syncope.     Found to have acute submassive saddle PE with extensive clot burden  Suspected LLE DVT   TTE revealed EF at 60-65% with severe dilation of right ventricle. Severely reduced RV systolic function noted with a positive Hermosillo's sign, and moderate TR  Troponin rising, continue to trend cardiac enzymes and lactate    Full anticoagulation on Heparin gtt, titrate per protocol    sPESI = 0  GARRETT intermediate risk  RIETE intermediate risk    Patient is a Orthodoxy.   Patient and his daughter/secondary HCP Anahi were educated on risks of bleeding with fibrinolytic therapy, discussed reserving use of systemic thrombolytic therapy unless patient develops hemodynamic instability, obstructive shock, cardiac arrest, or hypoxic respiratory failure.  Case discussed with multidisciplinary PERT team, plan for Inari tomorrow  Recommend admission to stepdown unit for continuous tele monitoring  NPO after midnight for planned intervention    Patient does not require ICU admission at present, please reconsult should clinical condition change  Case d/w ICU Attending Dr. Levine

## 2023-05-16 NOTE — CONSULT NOTE ADULT - SUBJECTIVE AND OBJECTIVE BOX
HPI: 72-year-old male with a history of DVT on Eliquis twice a day, hypertension presents w/ SOB. Patient states this morning when he was picking up the garbage can and bending over, he suddenly started feeling SOB, dizzy, sweaty, and nauseous. Patient at that time went inside to sit on his recliner chair and rest where he overall felt better. Then patient climbed a flight of stairs in house when his symptomst started again. Hence EMS was called. Of note, patient was recently covid positive and was on covid medication prescribed by his PCP that he does not know name of. Patient also was recently diagnosed w/ left DVT end of march. Patient was on eliquis 2.5mg BID for it. Patient has not been taking eliquis for the last few days as patient had completed 30 days worth bottle. Patient was supposed to go back to repeat ultrasound and was not able to due to recent covid. Patient thus never got a new bottle of eliquis. Patient follows Dr. Norman at [facility] with last visit 1.5 years ago. Patient denies fevers, chills, chest pain, palpitations, abdominal pain, n/v. Patient at that time felt SOB, dizziness/lightheaded, nausea but does not feel it anymore. No hx of PPM, stents, MI, arrhythmia CHF.           PAST MEDICAL & SURGICAL HISTORY:            ECHO  FINDINGS: None reported      MEDICATIONS  (STANDING):    MEDICATIONS  (PRN):      FAMILY HISTORY:    Denies Family history of CAD or early MI      ROS negative except as noted above      SOCIAL HISTORY:    No tobacco or drug use. Ocassional alc. Independent.     Vital Signs Last 24 Hrs  T(C): 36.1 (16 May 2023 08:07), Max: 36.1 (16 May 2023 08:07)  T(F): 97 (16 May 2023 08:07), Max: 97 (16 May 2023 08:07)  HR: 95 (16 May 2023 08:07) (95 - 95)  BP: 140/82 (16 May 2023 08:07) (140/82 - 140/82)  BP(mean): --  RR: 18 (16 May 2023 08:07) (18 - 18)  SpO2: 98% (16 May 2023 08:07) (98% - 98%)    Parameters below as of 16 May 2023 08:07  Patient On (Oxygen Delivery Method): nasal cannula  O2 Flow (L/min): 2      Physical Exam:  General: Well developed, well nourished, NAD  HEENT: NCAT, PERRLA, EOMI bl, moist mucous membranes   Neck: Supple, nontender  Neurology: A&Ox3, nonfocal, sensation intact   Respiratory: CTA B/L, No wheezes   CV: RRR, +S1/S2, no murmurs  Abdominal: Soft, NT, ND   Extremities: No Cyanosis or edema    Heme: No obvious ecchymosis or petechiae   Skin: warm, dry, normal color      ECG: Sinus tachycardia, VR 112bpm     I&O's Detail      LABS:                        16.4   7.87  )-----------( 175      ( 16 May 2023 09:10 )             49.9     05-16    136  |  105  |  22  ----------------------------<  109<H>  3.7   |  27  |  0.91    Ca    8.8      16 May 2023 09:10  Mg     2.2     05-16    TPro  7.9  /  Alb  3.7  /  TBili  0.9  /  DBili  x   /  AST  20  /  ALT  31  /  AlkPhos  96  05-16            I&O's Summary    BNP  RADIOLOGY & ADDITIONAL STUDIES:

## 2023-05-17 LAB
ALBUMIN SERPL ELPH-MCNC: 3.5 G/DL — SIGNIFICANT CHANGE UP (ref 3.3–5.2)
ALP SERPL-CCNC: 84 U/L — SIGNIFICANT CHANGE UP (ref 40–120)
ALT FLD-CCNC: 17 U/L — SIGNIFICANT CHANGE UP
ANION GAP SERPL CALC-SCNC: 14 MMOL/L — SIGNIFICANT CHANGE UP (ref 5–17)
APTT BLD: 75.4 SEC — HIGH (ref 27.5–35.5)
AST SERPL-CCNC: 18 U/L — SIGNIFICANT CHANGE UP
BASOPHILS # BLD AUTO: 0.04 K/UL — SIGNIFICANT CHANGE UP (ref 0–0.2)
BASOPHILS NFR BLD AUTO: 0.5 % — SIGNIFICANT CHANGE UP (ref 0–2)
BILIRUB SERPL-MCNC: 0.6 MG/DL — SIGNIFICANT CHANGE UP (ref 0.4–2)
BUN SERPL-MCNC: 19.7 MG/DL — SIGNIFICANT CHANGE UP (ref 8–20)
CALCIUM SERPL-MCNC: 8.3 MG/DL — LOW (ref 8.4–10.5)
CHLORIDE SERPL-SCNC: 105 MMOL/L — SIGNIFICANT CHANGE UP (ref 96–108)
CO2 SERPL-SCNC: 22 MMOL/L — SIGNIFICANT CHANGE UP (ref 22–29)
CREAT SERPL-MCNC: 0.99 MG/DL — SIGNIFICANT CHANGE UP (ref 0.5–1.3)
EGFR: 80 ML/MIN/1.73M2 — SIGNIFICANT CHANGE UP
EOSINOPHIL # BLD AUTO: 0.08 K/UL — SIGNIFICANT CHANGE UP (ref 0–0.5)
EOSINOPHIL NFR BLD AUTO: 1 % — SIGNIFICANT CHANGE UP (ref 0–6)
GLUCOSE SERPL-MCNC: 103 MG/DL — HIGH (ref 70–99)
HCT VFR BLD CALC: 42.5 % — SIGNIFICANT CHANGE UP (ref 39–50)
HCT VFR BLD CALC: 43.3 % — SIGNIFICANT CHANGE UP (ref 39–50)
HCV AB S/CO SERPL IA: 0.1 S/CO — SIGNIFICANT CHANGE UP (ref 0–0.99)
HCV AB SERPL-IMP: SIGNIFICANT CHANGE UP
HGB BLD-MCNC: 14.6 G/DL — SIGNIFICANT CHANGE UP (ref 13–17)
HGB BLD-MCNC: 14.9 G/DL — SIGNIFICANT CHANGE UP (ref 13–17)
IMM GRANULOCYTES NFR BLD AUTO: 0.5 % — SIGNIFICANT CHANGE UP (ref 0–0.9)
LYMPHOCYTES # BLD AUTO: 1.14 K/UL — SIGNIFICANT CHANGE UP (ref 1–3.3)
LYMPHOCYTES # BLD AUTO: 14.3 % — SIGNIFICANT CHANGE UP (ref 13–44)
MCHC RBC-ENTMCNC: 29.3 PG — SIGNIFICANT CHANGE UP (ref 27–34)
MCHC RBC-ENTMCNC: 29.6 PG — SIGNIFICANT CHANGE UP (ref 27–34)
MCHC RBC-ENTMCNC: 34.4 GM/DL — SIGNIFICANT CHANGE UP (ref 32–36)
MCHC RBC-ENTMCNC: 34.4 GM/DL — SIGNIFICANT CHANGE UP (ref 32–36)
MCV RBC AUTO: 85.1 FL — SIGNIFICANT CHANGE UP (ref 80–100)
MCV RBC AUTO: 86 FL — SIGNIFICANT CHANGE UP (ref 80–100)
MONOCYTES # BLD AUTO: 0.87 K/UL — SIGNIFICANT CHANGE UP (ref 0–0.9)
MONOCYTES NFR BLD AUTO: 10.9 % — SIGNIFICANT CHANGE UP (ref 2–14)
NEUTROPHILS # BLD AUTO: 5.78 K/UL — SIGNIFICANT CHANGE UP (ref 1.8–7.4)
NEUTROPHILS NFR BLD AUTO: 72.8 % — SIGNIFICANT CHANGE UP (ref 43–77)
PLATELET # BLD AUTO: 175 K/UL — SIGNIFICANT CHANGE UP (ref 150–400)
PLATELET # BLD AUTO: 183 K/UL — SIGNIFICANT CHANGE UP (ref 150–400)
POTASSIUM SERPL-MCNC: 3.8 MMOL/L — SIGNIFICANT CHANGE UP (ref 3.5–5.3)
POTASSIUM SERPL-SCNC: 3.8 MMOL/L — SIGNIFICANT CHANGE UP (ref 3.5–5.3)
PROT SERPL-MCNC: 6.1 G/DL — LOW (ref 6.6–8.7)
RBC # BLD: 4.94 M/UL — SIGNIFICANT CHANGE UP (ref 4.2–5.8)
RBC # BLD: 5.09 M/UL — SIGNIFICANT CHANGE UP (ref 4.2–5.8)
RBC # FLD: 13.2 % — SIGNIFICANT CHANGE UP (ref 10.3–14.5)
RBC # FLD: 13.7 % — SIGNIFICANT CHANGE UP (ref 10.3–14.5)
SODIUM SERPL-SCNC: 141 MMOL/L — SIGNIFICANT CHANGE UP (ref 135–145)
TROPONIN T SERPL-MCNC: 0.02 NG/ML — SIGNIFICANT CHANGE UP (ref 0–0.06)
WBC # BLD: 15.95 K/UL — HIGH (ref 3.8–10.5)
WBC # BLD: 7.95 K/UL — SIGNIFICANT CHANGE UP (ref 3.8–10.5)
WBC # FLD AUTO: 15.95 K/UL — HIGH (ref 3.8–10.5)
WBC # FLD AUTO: 7.95 K/UL — SIGNIFICANT CHANGE UP (ref 3.8–10.5)

## 2023-05-17 PROCEDURE — 37184 PRIM ART M-THRMBC 1ST VSL: CPT | Mod: RT

## 2023-05-17 PROCEDURE — 99233 SBSQ HOSP IP/OBS HIGH 50: CPT

## 2023-05-17 PROCEDURE — 36014 PLACE CATHETER IN ARTERY: CPT | Mod: RT

## 2023-05-17 PROCEDURE — 75741 ARTERY X-RAYS LUNG: CPT | Mod: 26,XU

## 2023-05-17 PROCEDURE — 99232 SBSQ HOSP IP/OBS MODERATE 35: CPT

## 2023-05-17 PROCEDURE — 99152 MOD SED SAME PHYS/QHP 5/>YRS: CPT

## 2023-05-17 RX ORDER — ASCORBIC ACID 60 MG
500 TABLET,CHEWABLE ORAL DAILY
Refills: 0 | Status: DISCONTINUED | OUTPATIENT
Start: 2023-05-17 | End: 2023-05-22

## 2023-05-17 RX ORDER — CHOLECALCIFEROL (VITAMIN D3) 125 MCG
1000 CAPSULE ORAL DAILY
Refills: 0 | Status: DISCONTINUED | OUTPATIENT
Start: 2023-05-17 | End: 2023-05-22

## 2023-05-17 RX ORDER — ACETAMINOPHEN 500 MG
650 TABLET ORAL EVERY 6 HOURS
Refills: 0 | Status: DISCONTINUED | OUTPATIENT
Start: 2023-05-17 | End: 2023-05-22

## 2023-05-17 RX ORDER — SODIUM CHLORIDE 9 MG/ML
250 INJECTION INTRAMUSCULAR; INTRAVENOUS; SUBCUTANEOUS ONCE
Refills: 0 | Status: COMPLETED | OUTPATIENT
Start: 2023-05-17 | End: 2023-05-17

## 2023-05-17 RX ORDER — ONDANSETRON 8 MG/1
4 TABLET, FILM COATED ORAL EVERY 6 HOURS
Refills: 0 | Status: DISCONTINUED | OUTPATIENT
Start: 2023-05-17 | End: 2023-05-22

## 2023-05-17 RX ADMIN — SODIUM CHLORIDE 250 MILLILITER(S): 9 INJECTION INTRAMUSCULAR; INTRAVENOUS; SUBCUTANEOUS at 22:37

## 2023-05-17 RX ADMIN — HEPARIN SODIUM 1200 UNIT(S)/HR: 5000 INJECTION INTRAVENOUS; SUBCUTANEOUS at 05:08

## 2023-05-17 RX ADMIN — LOSARTAN POTASSIUM 25 MILLIGRAM(S): 100 TABLET, FILM COATED ORAL at 05:07

## 2023-05-17 RX ADMIN — HEPARIN SODIUM 1200 UNIT(S)/HR: 5000 INJECTION INTRAVENOUS; SUBCUTANEOUS at 10:57

## 2023-05-17 NOTE — PROGRESS NOTE ADULT - ASSESSMENT
Now s/p INARI Aspiration of Significant Clot [place] in Right pulmonary Artery with some improvement (patient still with some clot burden in right pulmonary vasculature -PRELIMINARY REPORT, PENDING OFFICIAL REPORT) via RFV 24 [place] with Dr. Howard Lewis, tolerated procedure well. Pt arrived to recovery in NAD and HDS, RFV access site stable with flow stasis device in place, no bleed/hematoma, distal pulse +1, RLE remains acyanotic, warm to touch; motor/sensory function intact. Patient denies chest pain, chest pressure, shortness of breath at rest, palpitations, dizziness, orthopnea at this time. Patient is currently on RA 02sat 94-96%.   Intraprocedurally: Lidocaine 1% 5ml; Midazolam 2mg IV; Fentanyl 150mcg IV; Heparin 6,000 units IV; NS Bolus 250; Omnipaque 124ml      Plan:  -Formal cath report pending  -Post procedure management/monitoring per protocol  -Access site precautions  -Maintain supine position with HOB flat x 4 hours. If Right groin site stable w/o complications, ok to raise HOB to 30 degrees in 4 hours at 1245 AM  -Maintain flow stasis device (suture retention device) to Right Femoral Vein. Cardiology ACP will remove on 5/18 during AM rounds.  -Please notify cardiology ACP immediately if patient develops any active bleeding/ groin hematoma, or any sudden change in hemodynamics.  -Please notify cardiology ACP immediately if patient develops any change in color/temperature/sensation to RLE  -Resume Heparin gtt immediately after procedure at previous rate 1200 units/hr. Please monitor Right groin for hematoma/bleeding.  -Please check CBC and PTT at 1130 PM.   -Labs and EKG in am  -NS 0.9% 250ml/hr x 1 bolus: post procedure KURT ppx   -Repeat ECG if any clinical indication or change on tele  -Educated regarding post procedure management and care  -Discussed the importance of RF modification  -DISPO: Further care per Medicine and Cardiology team     Now s/p INARI Aspiration of Significant Clot [place] in Right pulmonary Artery with some improvement (patient still with some clot burden in right pulmonary vasculature -PRELIMINARY REPORT, PENDING OFFICIAL REPORT) via RFV 24 [place] with Dr. Howard Lewis, tolerated procedure well. Pt arrived to recovery in NAD and HDS, RFV access site stable with flow stasis device in place, no bleed/hematoma, distal pulse +1, RLE remains acyanotic, warm to touch; motor/sensory function intact. Patient denies chest pain, chest pressure, shortness of breath at rest, palpitations, dizziness, orthopnea at this time. Patient is currently on RA 02sat 94-96%.   Intraprocedurally: Lidocaine 1% 5ml; Midazolam 2mg IV; Fentanyl 150mcg IV; Heparin 6,000 units IV; NS Bolus 250; Omnipaque 124ml      Plan:  -Formal cath report pending  -Post procedure management/monitoring per protocol  -Access site precautions  -Maintain supine position with HOB flat x 4 hours. If Right groin site stable w/o complications, ok to raise HOB to 30 degrees in 4 hours at 1245 AM  -Maintain flow stasis device (suture retention device) to Right Femoral Vein. Interventional Cardiology ACP will remove on 5/18 during AM rounds.  -Please notify cardiology ACP immediately if patient develops any active bleeding/ groin hematoma, or any sudden change in hemodynamics.  -Please notify cardiology ACP immediately if patient develops any change in color/temperature/sensation to RLE  -Resume Heparin gtt immediately after procedure at previous rate 1200 units/hr. Please monitor Right groin for hematoma/bleeding.  -Please check CBC and PTT at 1130 PM.   -Labs and EKG in am  -NS 0.9% 250ml/hr x 1 bolus: post procedure KURT ppx   -Repeat ECG if any clinical indication or change on tele  -Educated regarding post procedure management and care  -Discussed the importance of RF modification  -DISPO: Further care per Medicine and Cardiology team

## 2023-05-17 NOTE — PROGRESS NOTE ADULT - SUBJECTIVE AND OBJECTIVE BOX
Saddle embolus of pulmonary artery without acute cor pulmonale    HPI:  73 yo Male pt. who is  transferred from [facility] for saddle PE. Pt states that in early april he had some lt. calf pain ( thought pulled muscle ) and went for outpatient US and was found to have a DVT. Pt states that his doctor started him on eliquis 2.5 mg BID. Pt states that 10 days ago he was diagnosed with covid , was tested for viral illness like symptoms and was started on molnupiravir. Pt states that he had a bad reaction to the molnupiravir and had weakness, bad dreams and nausea. Pt states that he was not really able to tolerate po and stopped the eliquis and molnupiravir 5 days ago. Today he went to take trash to the curb when he suddenly felt weak, sob and felt like he as going to pass out. Pt went into the house and still felt poorly so his wife called an ambulance. Pt. is on iv heparin , seen by MICU and cardiology team and plan is to do thrombectomy in am. no cp, sob , no hypoxia at the time of admission. covid 19 positive. (16 May 2023 16:51)    Interval History:  Patient was seen and examined at bedside around 9:30 am.  Feels OK.   Denies chest pain, palpitations, shortness of breath, headache, dizziness, visual symptoms, nausea, vomiting or abdominal pain.  Denies pain / paresthesia / swelling in left leg.    ROS:  As per interval history otherwise unremarkable.    PHYSICAL EXAM:  Vital Signs   T(C): 36.7 (17 May 2023 11:01), Max: 37 (17 May 2023 09:17)  T(F): 98 (17 May 2023 11:01), Max: 98.6 (17 May 2023 09:17)  HR: 77 (17 May 2023 11:01) (66 - 103)  BP: 119/74 (17 May 2023 11:01) (101/72 - 159/106)  RR: 16 (17 May 2023 05:05) (16 - 20)  SpO2: 96% (17 May 2023 11:01) (95% - 100%)  Parameters below as of 17 May 2023 11:01  Patient On (Oxygen Delivery Method): nasal cannula  General: Elderly male sitting in bed comfortably. No acute distress  HEENT: EOMI. Clear conjunctivae. Moist mucus membrane  Neck: Supple.   Chest: CTA bilaterally. No wheezing, rales or rhonchi. No chest wall tenderness.  Heart: Normal S1 & S2. RRR.   Abdomen: Non distended. Soft. Non-tender. + BS  Ext: No pedal edema. No calf tenderness   Neuro: AAO x 3. No focal deficit. No speech disorder  Skin: Warm and Dry  Psychiatry: Normal mood and affect    I&O's Summary    17 May 2023 07:01  -  17 May 2023 13:29  --------------------------------------------------------  IN: 0 mL / OUT: 400 mL / NET: -400 mL    LABS:                      14.6   7.95  )-----------( 183      ( 17 May 2023 02:24 )             42.5     05-17    141  |  105  |  19.7  ----------------------------<  103<H>  3.8   |  22.0  |  0.99    Ca    8.3<L>      17 May 2023 02:24  Mg     2.2     05-16    TPro  6.1<L>  /  Alb  3.5  /  TBili  0.6  /  DBili  x   /  AST  18  /  ALT  17  /  AlkPhos  84  05-17    PT/INR - ( 16 May 2023 14:00 )   PT: 13.0 sec;   INR: 1.11 ratio       PTT - ( 17 May 2023 04:20 )  PTT:75.4 sec    RADIOLOGY & ADDITIONAL STUDIES:  Reviewed     MEDICATIONS  (STANDING):  heparin  Infusion.  Unit(s)/Hr (12 mL/Hr) IV Continuous <Continuous>  losartan 25 milliGRAM(s) Oral daily    MEDICATIONS  (PRN):  albuterol    90 MICROgram(s) HFA Inhaler 2 Puff(s) Inhalation every 6 hours PRN Shortness of Breath and/or Wheezing  heparin   Injectable 2500 Unit(s) IV Push every 6 hours PRN For aPTT between 40 - 57  heparin   Injectable 5500 Unit(s) IV Push every 6 hours PRN For aPTT less than 40

## 2023-05-17 NOTE — PROGRESS NOTE ADULT - ASSESSMENT
72 year old male with history of Superficial Thrombophlebitis, LLE DVT diagnosed a month ago), recent/current COVID-19 and HTN presented as a transfer from [facility] with Saddle PE.      1) Saddle PE  - Recently diagnosed with LLE DVT (stopped Eliquis few days ago)  - Continue Heparin Infusion  - Plan for mechanical thrombectomy today  - Hypercoagulable work up as an outpatient   - Cardio follow up noted    2) LLE DVT  - Patient is on Heparin Infusion    3) COVID-19  - No respiratory distress  - Supportive care  - Could not tolerate Molnupiravir as an outpatient     4) HTN  - Continue Losartan    DVT Prophylaxis -- Patient is on Heparin Infusion.    Dispo: Home likely in 24-48 hours.    Updated patient's family at bedside.

## 2023-05-17 NOTE — PROGRESS NOTE ADULT - SUBJECTIVE AND OBJECTIVE BOX
[facility]                                              INTERVENTIONAL CARDIOLOGY AT [facility]                                                   [address], [place] [address]                                             Telephone: [phone]. Fax:[phone]                                                       INTERVENTIONAL CARDIOLOGY CONSULTATION NOTE                                                                                             History obtained by: Patient and medical record  Reason for Consultation: Evaluation for cardiac catheterization  Available pt records reviewed: Yes [ x ] No [  ]    Chief complaint:    Patient is a 72y old  Male who presents with a chief complaint of Saddle PE (17 May 2023 13:29)      HPI:  71 yo Male PMH HTN; Vericose veins transferred from [facility] for saddle PE. Pt states that in early april he had some lt. calf pain ( thought pulled muscle ) and went for outpatient US and was found to have a DVT. Pt states that his doctor started him on eliquis 2.5 mg BID. Pt states that 10 days ago he was diagnosed with covid , was tested for viral illness like symptoms and was started on molnupiravir. Pt states that he had a bad reaction to the molnupiravir and had weakness, bad dreams and nausea. Pt states that he was not really able to tolerate po and stopped the eliquis and molnupiravir 5 days ago. Today he went to take trash to the curb when he suddenly felt weak, sob and felt like he as going to pass out. Pt went into the house and still felt poorly so his wife called an ambulance. +Trop 0.14 -> 0.02. . CTA: Saddle pulmonary embolus involving the central pulmonary arterial tree and extending into all 5 lobes. TTE: EF 60-65% with severely enlarged RV and severely reduced RV systolic function; RV apex hyperkinetic with free wall hypokinesis suggesting McConell's sign. IV heparin was initiated. Patient was evaluated by MICU team -> no ICU required at this time. Cardiology evaluated the patient for PERT. Patient presents to [facility] for Inari aspiration of pulmonary emboli.     PAST MEDICAL HISTORY  SVT (supraventricular tachycardia)    Acute deep vein thrombosis (DVT) of left lower extremity    Acute deep vein thrombosis (DVT) of left lower extremity    2019 novel coronavirus disease (COVID-19)    HTN (hypertension)        Associated Risk Factors:        Frailty Assessment: (none/mild/mod/severe): none       Cerebrovascular Disease: N/A       Chronic Lung Disease: N/A       Peripheral Arterial Disease: N/A       Chronic Kidney Disease (if yes, what is GFR): N/A       Uncontrolled Diabetes (if yes, what is HgbA1C or FBS): N/A       Poorly Controlled Hypertension (if yes, what is SBP): N/A       Morbid Obesity (if yes, what is BMI): N/A       History of Recent Ventricular Arrhythmia: N/A       Inability to Ambulate Safely: N/A       Need for Therapeutic Anticoagulation: Eliquis       Antiplatelet or Contrast Allergy: N/A      PAST SURGICAL HISTORY  S/P appendectomy      FAMILY HISTORY:  FH: prostate cancer (Father)      Family History of Premature Cardiovascular Disease:  Yes [  ] No [X  ]    HOME MEDICATIONS:  losartan 25 mg oral tablet: 1 tab(s) orally once a day (16 May 2023 16:57)      CURRENT CARDIAC MEDICATIONS:  losartan 25 milliGRAM(s) Oral daily    ALLERGIES:   morphine (Hives)      REVIEW OF SYMPTOMS:   CONSTITUTIONAL: no fever, no chills, no weight loss, no weight gain, no fatigue   CARDIOVASCULAR: no active chest pain, chest pressure, shortness of breath at rest, palpitations, dizziness, orthopnea; +MACDONALD  RESPIRATORY: no Shortness of breath, no cough, no wheezing  : No dysuria, no hematuria   GI: No dark color stool, no nausea, no diarrhea, no constipation, no abdominal pain   NEURO: No headache, no slurred speech   ALL OTHER REVIEW OF SYSTEMS ARE NEGATIVE.    VITAL SIGNS:  T(C): 37.1 (05-17-23 @ 15:40), Max: 37.1 (05-17-23 @ 15:40)  T(F): 98.7 (05-17-23 @ 15:40), Max: 98.7 (05-17-23 @ 15:40)  HR: 81 (05-17-23 @ 21:00) (66 - 92)  BP: 129/86 (05-17-23 @ 21:00) (101/72 - 129/86)  RR: 17 (05-17-23 @ 21:00) (16 - 20)  SpO2: 97% (05-17-23 @ 21:00) (95% - 97%)    INTAKE AND OUTPUT:     05-17 @ 07:01  -  05-17 @ 21:02  --------------------------------------------------------  IN: 0 mL / OUT: 400 mL / NET: -400 mL        PHYSICAL EXAM:  Constitutional: Comfortable . No acute distress.   HEENT: Atraumatic and normocephalic , neck is supple . no JVD. No carotid bruit.  CNS: A&Ox3. No focal deficits.   Respiratory: CTAB, unlabored   Cardiovascular: RRR normal s1 s2. No murmur. No rubs or gallop.  Gastrointestinal: Soft, non-tender. +Bowel sounds.   Extremities: 2+ radial Peripheral Pulses, No clubbing, cyanosis, or edema. b/l LE warm to touch, acyanotic; no edema to b/l LE. 1+ DP pulses b/l. motor/sensory function intact.   Psychiatric: Calm . no agitation.   Skin: Warm and dry, no ulcers on extremities     LABS:  ( 17 May 2023 08:17 )  Troponin T  0.02 ,  CPK  X    , CKMB  X    , BNP X        , ( 16 May 2023 15:18 )  Troponin T  0.14<H>,  CPK  X    , CKMB  X    , BNP X        , ( 16 May 2023 13:35 )  Troponin T  X    ,  CPK  51   , CKMB  X    , BNP X                                  14.6   7.95  )-----------( 183      ( 17 May 2023 02:24 )             42.5     05-17    141  |  105  |  19.7  ----------------------------<  103<H>  3.8   |  22.0  |  0.99    Ca    8.3<L>      17 May 2023 02:24  Mg     2.2     05-16    TPro  6.1<L>  /  Alb  3.5  /  TBili  0.6  /  DBili  x   /  AST  18  /  ALT  17  /  AlkPhos  84  05-17    PT/INR - ( 16 May 2023 14:00 )   PT: 13.0 sec;   INR: 1.11 ratio         PTT - ( 17 May 2023 04:20 )  PTT:75.4 sec      ECG: < from: 12 Lead ECG (05.16.23 @ 14:39) >  Normal sinus rhythm    < end of copied text >      CARDIAC TESTING   ECHO: < from: TTE Echo Complete w/o Contrast w/ Doppler (05.16.23 @ 15:19) >    Summary:   1. Left ventricular ejection fraction, by visual estimation, is 60 to   65%.   2. Normal global left ventricular systolic function.   3. Severely enlarged right ventricle. Severely reduced RV systolic   function. RV apex appears hyperkinetic, with free wall hypokinesis   suggestive of Dougherty's sign.   4. Mildly enlarged right atrium.   5. Normal left atrial size.   6. Moderate tricuspid regurgitation.   7. Estimated pulmonary artery systolic pressure is 43.3 mmHg assuming a   right atrial pressure of 8 mmHg, which is consistent with mild pulmonary   hypertension.   8. Mild mitral annular calcification.   9. Thickening of the anterior and posterior mitral valve leaflets.  10. Trace mitral valve regurgitation.  11. Sclerotic aortic valve with normal opening.  12. There is no evidence of pericardial effusion.  13. There are no prior studies on this patient for comparison purposes.    Felpia Thomas MD Electronically signed on 5/16/2023 at 4:56:29 PM    < end of copied text >

## 2023-05-17 NOTE — PROGRESS NOTE ADULT - ASSESSMENT
71 y/o male with hx of varicose veins and HTN presenting to [facility] with complaints of shortness of breath. He was taking the trash out to the curb today and felt weak, SOB, and felt as if he was going to pass out. He went into the house and still felt weak; his wife called an ambulance. Patient feels no symptoms if he isn't doing anything. He had COVID 10 days ago. He was taking Eliquis 2.5 mg BID for superficial GSV thrombosis and below the knee gastrocnemius DVT. He stopped taking Eliquis 4-5 days ago while he had COVID. Patient is transferred from [facility] for saddle PE. Of note, patient is a Christianity. ECHo ef 60% Severely reduced RV systolic function. + Hermosillo's sign trop to 0.14 trended down. Awaiting mechanical thrombectomy   73 y/o male with hx of varicose veins and HTN presenting to [facility] with complaints of shortness of breath. He was taking the trash out to the curb today and felt weak, SOB, and felt as if he was going to pass out. He went into the house and still felt weak; his wife called an ambulance. Patient feels no symptoms if he isn't doing anything. He had COVID 10 days ago. He was taking Eliquis 2.5 mg BID for superficial GSV thrombosis and below the knee gastrocnemius DVT. He stopped taking Eliquis 4-5 days ago while he had COVID. Patient is transferred from [facility] for saddle PE. Of note, patient is a Mu-ism. ECHo ef 60% Severely reduced RV systolic function. + Hermosillo's sign trop to 0.14 trended down. Awaiting mechanical thrombectomy

## 2023-05-18 DIAGNOSIS — I82.432 ACUTE EMBOLISM AND THROMBOSIS OF LEFT POPLITEAL VEIN: ICD-10-CM

## 2023-05-18 LAB
ANION GAP SERPL CALC-SCNC: 14 MMOL/L — SIGNIFICANT CHANGE UP (ref 5–17)
APTT BLD: 152.1 SEC — CRITICAL HIGH (ref 27.5–35.5)
APTT BLD: 26.3 SEC — LOW (ref 27.5–35.5)
APTT BLD: 55 SEC — HIGH (ref 27.5–35.5)
APTT BLD: 80.9 SEC — HIGH (ref 27.5–35.5)
APTT BLD: >200 SEC — CRITICAL HIGH (ref 27.5–35.5)
BUN SERPL-MCNC: 19 MG/DL — SIGNIFICANT CHANGE UP (ref 8–20)
CALCIUM SERPL-MCNC: 8.1 MG/DL — LOW (ref 8.4–10.5)
CHLORIDE SERPL-SCNC: 104 MMOL/L — SIGNIFICANT CHANGE UP (ref 96–108)
CO2 SERPL-SCNC: 22 MMOL/L — SIGNIFICANT CHANGE UP (ref 22–29)
CREAT SERPL-MCNC: 1.05 MG/DL — SIGNIFICANT CHANGE UP (ref 0.5–1.3)
EGFR: 75 ML/MIN/1.73M2 — SIGNIFICANT CHANGE UP
GLUCOSE SERPL-MCNC: 141 MG/DL — HIGH (ref 70–99)
HCT VFR BLD CALC: 36.3 % — LOW (ref 39–50)
HCT VFR BLD CALC: 40.4 % — SIGNIFICANT CHANGE UP (ref 39–50)
HCT VFR BLD CALC: 41.8 % — SIGNIFICANT CHANGE UP (ref 39–50)
HCT VFR BLD CALC: 42.6 % — SIGNIFICANT CHANGE UP (ref 39–50)
HGB BLD-MCNC: 12.4 G/DL — LOW (ref 13–17)
HGB BLD-MCNC: 13.8 G/DL — SIGNIFICANT CHANGE UP (ref 13–17)
HGB BLD-MCNC: 13.9 G/DL — SIGNIFICANT CHANGE UP (ref 13–17)
HGB BLD-MCNC: 13.9 G/DL — SIGNIFICANT CHANGE UP (ref 13–17)
MAGNESIUM SERPL-MCNC: 1.9 MG/DL — SIGNIFICANT CHANGE UP (ref 1.6–2.6)
MCHC RBC-ENTMCNC: 28.7 PG — SIGNIFICANT CHANGE UP (ref 27–34)
MCHC RBC-ENTMCNC: 28.9 PG — SIGNIFICANT CHANGE UP (ref 27–34)
MCHC RBC-ENTMCNC: 29.6 PG — SIGNIFICANT CHANGE UP (ref 27–34)
MCHC RBC-ENTMCNC: 32.6 GM/DL — SIGNIFICANT CHANGE UP (ref 32–36)
MCHC RBC-ENTMCNC: 33 GM/DL — SIGNIFICANT CHANGE UP (ref 32–36)
MCHC RBC-ENTMCNC: 34.4 GM/DL — SIGNIFICANT CHANGE UP (ref 32–36)
MCV RBC AUTO: 86 FL — SIGNIFICANT CHANGE UP (ref 80–100)
MCV RBC AUTO: 87.4 FL — SIGNIFICANT CHANGE UP (ref 80–100)
MCV RBC AUTO: 88 FL — SIGNIFICANT CHANGE UP (ref 80–100)
PLATELET # BLD AUTO: 182 K/UL — SIGNIFICANT CHANGE UP (ref 150–400)
PLATELET # BLD AUTO: 189 K/UL — SIGNIFICANT CHANGE UP (ref 150–400)
PLATELET # BLD AUTO: 192 K/UL — SIGNIFICANT CHANGE UP (ref 150–400)
POTASSIUM SERPL-MCNC: 3.8 MMOL/L — SIGNIFICANT CHANGE UP (ref 3.5–5.3)
POTASSIUM SERPL-SCNC: 3.8 MMOL/L — SIGNIFICANT CHANGE UP (ref 3.5–5.3)
RBC # BLD: 4.7 M/UL — SIGNIFICANT CHANGE UP (ref 4.2–5.8)
RBC # BLD: 4.78 M/UL — SIGNIFICANT CHANGE UP (ref 4.2–5.8)
RBC # BLD: 4.84 M/UL — SIGNIFICANT CHANGE UP (ref 4.2–5.8)
RBC # FLD: 13.3 % — SIGNIFICANT CHANGE UP (ref 10.3–14.5)
RBC # FLD: 13.4 % — SIGNIFICANT CHANGE UP (ref 10.3–14.5)
RBC # FLD: 13.4 % — SIGNIFICANT CHANGE UP (ref 10.3–14.5)
SODIUM SERPL-SCNC: 139 MMOL/L — SIGNIFICANT CHANGE UP (ref 135–145)
WBC # BLD: 10.05 K/UL — SIGNIFICANT CHANGE UP (ref 3.8–10.5)
WBC # BLD: 10.21 K/UL — SIGNIFICANT CHANGE UP (ref 3.8–10.5)
WBC # BLD: 9.92 K/UL — SIGNIFICANT CHANGE UP (ref 3.8–10.5)
WBC # FLD AUTO: 10.05 K/UL — SIGNIFICANT CHANGE UP (ref 3.8–10.5)
WBC # FLD AUTO: 10.21 K/UL — SIGNIFICANT CHANGE UP (ref 3.8–10.5)
WBC # FLD AUTO: 9.92 K/UL — SIGNIFICANT CHANGE UP (ref 3.8–10.5)

## 2023-05-18 PROCEDURE — 99232 SBSQ HOSP IP/OBS MODERATE 35: CPT

## 2023-05-18 PROCEDURE — 99221 1ST HOSP IP/OBS SF/LOW 40: CPT | Mod: FS

## 2023-05-18 PROCEDURE — 99233 SBSQ HOSP IP/OBS HIGH 50: CPT

## 2023-05-18 RX ORDER — HEPARIN SODIUM 5000 [USP'U]/ML
800 INJECTION INTRAVENOUS; SUBCUTANEOUS
Qty: 25000 | Refills: 0 | Status: DISCONTINUED | OUTPATIENT
Start: 2023-05-18 | End: 2023-05-18

## 2023-05-18 RX ORDER — SODIUM CHLORIDE 9 MG/ML
500 INJECTION INTRAMUSCULAR; INTRAVENOUS; SUBCUTANEOUS ONCE
Refills: 0 | Status: COMPLETED | OUTPATIENT
Start: 2023-05-18 | End: 2023-05-18

## 2023-05-18 RX ORDER — HEPARIN SODIUM 5000 [USP'U]/ML
2500 INJECTION INTRAVENOUS; SUBCUTANEOUS EVERY 6 HOURS
Refills: 0 | Status: DISCONTINUED | OUTPATIENT
Start: 2023-05-18 | End: 2023-05-18

## 2023-05-18 RX ORDER — HEPARIN SODIUM 5000 [USP'U]/ML
5500 INJECTION INTRAVENOUS; SUBCUTANEOUS EVERY 6 HOURS
Refills: 0 | Status: DISCONTINUED | OUTPATIENT
Start: 2023-05-18 | End: 2023-05-18

## 2023-05-18 RX ORDER — HEPARIN SODIUM 5000 [USP'U]/ML
1000 INJECTION INTRAVENOUS; SUBCUTANEOUS
Qty: 25000 | Refills: 0 | Status: DISCONTINUED | OUTPATIENT
Start: 2023-05-18 | End: 2023-05-18

## 2023-05-18 RX ORDER — FERROUS SULFATE 325(65) MG
325 TABLET ORAL DAILY
Refills: 0 | Status: DISCONTINUED | OUTPATIENT
Start: 2023-05-18 | End: 2023-05-22

## 2023-05-18 RX ORDER — APIXABAN 2.5 MG/1
10 TABLET, FILM COATED ORAL EVERY 12 HOURS
Refills: 0 | Status: DISCONTINUED | OUTPATIENT
Start: 2023-05-18 | End: 2023-05-20

## 2023-05-18 RX ORDER — FOLIC ACID 0.8 MG
1 TABLET ORAL DAILY
Refills: 0 | Status: DISCONTINUED | OUTPATIENT
Start: 2023-05-18 | End: 2023-05-22

## 2023-05-18 RX ORDER — HEPARIN SODIUM 5000 [USP'U]/ML
5500 INJECTION INTRAVENOUS; SUBCUTANEOUS ONCE
Refills: 0 | Status: COMPLETED | OUTPATIENT
Start: 2023-05-18 | End: 2023-05-18

## 2023-05-18 RX ADMIN — Medication 1 TABLET(S): at 12:07

## 2023-05-18 RX ADMIN — HEPARIN SODIUM 900 UNIT(S)/HR: 5000 INJECTION INTRAVENOUS; SUBCUTANEOUS at 13:46

## 2023-05-18 RX ADMIN — Medication 1 MILLIGRAM(S): at 12:07

## 2023-05-18 RX ADMIN — HEPARIN SODIUM 0 UNIT(S)/HR: 5000 INJECTION INTRAVENOUS; SUBCUTANEOUS at 23:55

## 2023-05-18 RX ADMIN — HEPARIN SODIUM 800 UNIT(S)/HR: 5000 INJECTION INTRAVENOUS; SUBCUTANEOUS at 07:18

## 2023-05-18 RX ADMIN — Medication 325 MILLIGRAM(S): at 12:06

## 2023-05-18 RX ADMIN — LOSARTAN POTASSIUM 25 MILLIGRAM(S): 100 TABLET, FILM COATED ORAL at 05:46

## 2023-05-18 RX ADMIN — HEPARIN SODIUM 1000 UNIT(S)/HR: 5000 INJECTION INTRAVENOUS; SUBCUTANEOUS at 02:11

## 2023-05-18 RX ADMIN — HEPARIN SODIUM 800 UNIT(S)/HR: 5000 INJECTION INTRAVENOUS; SUBCUTANEOUS at 06:26

## 2023-05-18 RX ADMIN — APIXABAN 10 MILLIGRAM(S): 2.5 TABLET, FILM COATED ORAL at 17:04

## 2023-05-18 RX ADMIN — Medication 650 MILLIGRAM(S): at 22:56

## 2023-05-18 RX ADMIN — HEPARIN SODIUM 2500 UNIT(S): 5000 INJECTION INTRAVENOUS; SUBCUTANEOUS at 13:51

## 2023-05-18 RX ADMIN — Medication 500 MILLIGRAM(S): at 12:06

## 2023-05-18 RX ADMIN — Medication 650 MILLIGRAM(S): at 23:45

## 2023-05-18 RX ADMIN — HEPARIN SODIUM 5500 UNIT(S): 5000 INJECTION INTRAVENOUS; SUBCUTANEOUS at 06:30

## 2023-05-18 RX ADMIN — SODIUM CHLORIDE 166.67 MILLILITER(S): 9 INJECTION INTRAMUSCULAR; INTRAVENOUS; SUBCUTANEOUS at 12:00

## 2023-05-18 RX ADMIN — Medication 1000 UNIT(S): at 12:07

## 2023-05-18 NOTE — CHART NOTE - NSCHARTNOTEFT_GEN_A_CORE
Called by RN for patient on heparin full anticoagulation nomogram, requesting order for new stable dose of heparin  Ordered heparin 10ml/hr as per nomogram and stable dose   Will continue to monitor   RN to notify provider with any changes in patient status.

## 2023-05-18 NOTE — PROGRESS NOTE ADULT - SUBJECTIVE AND OBJECTIVE BOX
[facility] Division of Hospital Medicine    Chief Complaint:  Saddle PE    SUBJECTIVE / OVERNIGHT EVENTS:  Pt examined lying in bed  s/p mechanical thrombectomy with signifcant clot burden   On RA, denies any ongoing SOB or CP.   Overnight with oozing from groin site as well as active hematuria  Of note reports that he has a h/o spontaneous SVTs in the past (>10) and that he developed a spontaneous DVT for which he was treated with 1month of PO Eliquis 2.5 mg bid which completed around the time he caught covid  Patient denies chest pain, SOB, abd pain, N/V, fever, chills, dysuria or any other complaints. All remainder ROS negative.     MEDICATIONS  (STANDING):  ascorbic acid 500 milliGRAM(s) Oral daily  cholecalciferol 1000 Unit(s) Oral daily  ferrous    sulfate 325 milliGRAM(s) Oral daily  folic acid 1 milliGRAM(s) Oral daily  heparin  Infusion. 800 Unit(s)/Hr (8 mL/Hr) IV Continuous <Continuous>  losartan 25 milliGRAM(s) Oral daily  multivitamin 1 Tablet(s) Oral daily    MEDICATIONS  (PRN):  acetaminophen     Tablet .. 650 milliGRAM(s) Oral every 6 hours PRN Temp greater or equal to 38C (100.4F), Mild Pain (1 - 3), Moderate Pain (4 - 6)  albuterol    90 MICROgram(s) HFA Inhaler 2 Puff(s) Inhalation every 6 hours PRN Shortness of Breath and/or Wheezing  guaiFENesin Oral Liquid (Sugar-Free) 100 milliGRAM(s) Oral every 6 hours PRN Cough  heparin   Injectable 2500 Unit(s) IV Push every 6 hours PRN For aPTT between 40 - 57  heparin   Injectable 5500 Unit(s) IV Push every 6 hours PRN For aPTT less than 40  ondansetron Injectable 4 milliGRAM(s) IV Push every 6 hours PRN Nausea and/or Vomiting        I&O's Summary    17 May 2023 07:01  -  18 May 2023 07:00  --------------------------------------------------------  IN: 205 mL / OUT: 1500 mL / NET: -1295 mL    18 May 2023 07:01  -  18 May 2023 14:20  --------------------------------------------------------  IN: 548 mL / OUT: 275 mL / NET: 273 mL        PHYSICAL EXAM:  Vital Signs Last 24 Hrs  T(C): 36.8 (18 May 2023 08:25), Max: 37.1 (17 May 2023 15:40)  T(F): 98.3 (18 May 2023 08:25), Max: 98.7 (17 May 2023 15:40)  HR: 82 (18 May 2023 12:00) (73 - 97)  BP: 128/88 (18 May 2023 12:00) (103/76 - 132/94)  BP(mean): 94 (18 May 2023 12:00) (84 - 101)  RR: 19 (18 May 2023 12:00) (12 - 19)  SpO2: 96% (18 May 2023 12:00) (93% - 97%)    Parameters below as of 18 May 2023 12:00  Patient On (Oxygen Delivery Method): room air            CONSTITUTIONAL: Non toxic appearing, lying in bed  ENMT: Moist oral mucosa, no pharyngeal injection or exudates; normal dentition  RESPIRATORY: Normal respiratory effort; lungs are clear to auscultation bilaterally  CARDIOVASCULAR: Regular rate and rhythm, normal S1 and S2, no murmur/rub/gallop; No lower extremity edema; Peripheral pulses are 2+ bilaterally  ABDOMEN: Nontender to palpation, normoactive bowel sounds, no rebound/guarding; No hepatosplenomegaly  MUSCLOSKELETAL:  Rt groin site small hematoma, no active oozing at time of exam,  no clubbing or cyanosis of digits; no joint swelling or tenderness to palpation  PSYCH: A+O to person, place, and time; affect appropriate  NEUROLOGY: CN 2-12 are intact and symmetric; no gross sensory deficits;   SKIN: No rashes; no palpable lesions    LABS:                        13.9   10.05 )-----------( 192      ( 18 May 2023 12:28 )             42.6     05-18    139  |  104  |  19.0  ----------------------------<  141<H>  3.8   |  22.0  |  1.05    Ca    8.1<L>      18 May 2023 04:24  Mg     1.9     05-18    TPro  6.1<L>  /  Alb  3.5  /  TBili  0.6  /  DBili  x   /  AST  18  /  ALT  17  /  AlkPhos  84  05-17    PTT - ( 18 May 2023 12:20 )  PTT:55.0 sec  CARDIAC MARKERS ( 17 May 2023 08:17 )  x     / 0.02 ng/mL / x     / x     / x      CARDIAC MARKERS ( 16 May 2023 15:18 )  x     / 0.14 ng/mL / x     / x     / x              CAPILLARY BLOOD GLUCOSE            RADIOLOGY & ADDITIONAL TESTS:  Results Reviewed:   Imaging Personally Reviewed:  Electrocardiogram Personally Reviewed:

## 2023-05-18 NOTE — CONSULT NOTE ADULT - ASSESSMENT
Patient is a 72 year-old male admitted to [facility] for saddle PE.  Seen in consultation for IVC filter due to patient with hematuria and oozing right groin with downtrending hgb on anticoagulation . Labs and imaging have been reviewed. Patient's hgb with slow downtrend since admission (16.4-->13.9) however has remained stable so far today. Given that patient has saddle PE, agree with cardiology's recommendation to continuing with heparin gtt. Recommend  monitoring severity of hematuria and applying manual pressure if right groin begin to ooze again. Patient may benefit from bloodless medicine consultation if hgb continues to drop. If patient fails chemical AC can IVC filter placement given acute above the knee DVTs.       Please call extension [phone] with any questions, concerns or issues regarding above.

## 2023-05-18 NOTE — CHART NOTE - NSCHARTNOTEFT_GEN_A_CORE
Department of Cardiology                                                                  [facility]/[place] [address]                                                            Telephone: [phone]. Fax:[phone]                                                                                         CARDIOLOGY NOTE       Interval history/Plan:  No chest pain, SOB, or palpitations overnight. Did have some bleeding from right groin access site overnight. This morning suture removed from right venous access with small amount of bleeding, manual pressure held x 10minutes and Dstat dressing applied. No bruit appreciated, neurovascular intact + DP and warm extremity.   -Discussed with RN will perform groin and neurovascular checks q 15min x4, then q 30min x2 then q hour x 4. May raise HOB  30 degrees in 2 hours if remains HDS and no bleeding from site, HOB 45 degrees in 4 hours. Please maintain on bedrest  x 6 hours then re-evaluate     HPI:  71 yo Male pt. who is  transferred from [facility] for saddle PE. Pt states that in early april he had some lt. calf pain ( thought pulled muscle ) and went for outpatient US and was found to have a DVT. Pt states that his doctor started him on eliquis 2.5 mg BID. Pt states that 10 days ago he was diagnosed with covid , was tested for viral illness like symptoms and was started on molnupiravir. Pt states that he had a bad reaction to the molnupiravir and had weakness, bad dreams and nausea. Pt states that he was not really able to tolerate po and stopped the eliquis and molnupiravir 5 days ago. Today he went to take trash to the curb when he suddenly felt weak, sob and felt like he as going to pass out. Pt went into the house and still felt poorly so his wife called an ambulance. Pt. is on iv heparin , seen by MICU and cardiology team and plan is to do thrombectomy in am. no cp, sob , no hypoxia at the time of admission. covid 19 positive. (16 May 2023 16:51)    PAST MEDICAL & SURGICAL HISTORY:  SVT (supraventricular tachycardia)      Acute deep vein thrombosis (DVT) of left lower extremity       novel coronavirus disease (COVID-19)      HTN (hypertension)      S/P appendectomy        Allergies    morphine (Hives)    Intolerances      Home Medications:  losartan 25 mg oral tablet: 1 tab(s) orally once a day (16 May 2023 16:57)    MEDICATIONS  (STANDING):  ascorbic acid 500 milliGRAM(s) Oral daily  cholecalciferol 1000 Unit(s) Oral daily  heparin  Infusion. 800 Unit(s)/Hr (8 mL/Hr) IV Continuous <Continuous>  losartan 25 milliGRAM(s) Oral daily    MEDICATIONS  (PRN):  acetaminophen     Tablet .. 650 milliGRAM(s) Oral every 6 hours PRN Temp greater or equal to 38C (100.4F), Mild Pain (1 - 3), Moderate Pain (4 - 6)  albuterol    90 MICROgram(s) HFA Inhaler 2 Puff(s) Inhalation every 6 hours PRN Shortness of Breath and/or Wheezing  guaiFENesin Oral Liquid (Sugar-Free) 100 milliGRAM(s) Oral every 6 hours PRN Cough  heparin   Injectable 5500 Unit(s) IV Push every 6 hours PRN For aPTT less than 40  heparin   Injectable 2500 Unit(s) IV Push every 6 hours PRN For aPTT between 40 - 57  ondansetron Injectable 4 milliGRAM(s) IV Push every 6 hours PRN Nausea and/or Vomiting      Objective:  Vital Signs Last 24 Hrs  T(C): 36.8 (18 May 2023 08:25), Max: 37.1 (17 May 2023 15:40)  T(F): 98.3 (18 May 2023 08:25), Max: 98.7 (17 May 2023 15:40)  HR: 76 (18 May 2023 04:00) (73 - 97)  BP: 105/77 (18 May 2023 04:00) (103/76 - 132/94)  BP(mean): 84 (18 May 2023 04:00) (84 - 101)  RR: 18 (18 May 2023 04:00) (17 - 18)  SpO2: 95% (18 May 2023 04:00) (93% - 97%)    Parameters below as of 18 May 2023 04:00  Patient On (Oxygen Delivery Method): room air        CM: SR  Neuro: A&OX3, CN 2-12 intact  HEENT: NC, AT  Lungs: CTA B/L  CV: S1, S2, no murmur, RRR  Abd: Soft  Right Groin: suture removed from right venous access with small amount of bleeding, manual pressure held x 10minutes and Dstat dressing applied  Extremity: + distal pulses  EK.9   9.92  )-----------( 189      ( 18 May 2023 04:43 )             40.4     05-18    139  |  104  |  19.0  ----------------------------<  141<H>  3.8   |  22.0  |  1.05    Ca    8.1<L>      18 May 2023 04:24  Mg     1.9     18    TPro  6.1<L>  /  Alb  3.5  /  TBili  0.6  /  DBili  x   /  AST  18  /  ALT  17  /  AlkPhos  84  05-17    CARDIAC MARKERS ( 17 May 2023 08:17 )  x     / 0.02 ng/mL / x     / x     / x      CARDIAC MARKERS ( 16 May 2023 15:18 )  x     / 0.14 ng/mL / x     / x     / x      CARDIAC MARKERS ( 16 May 2023 13:35 )  x     / x     / 51 U/L / x     / 2.8 ng/mL      PT/INR - ( 16 May 2023 14:00 )   PT: 13.0 sec;   INR: 1.11 ratio         PTT - ( 18 May 2023 04:24 )  PTT:80.9 sec

## 2023-05-18 NOTE — PROGRESS NOTE ADULT - SUBJECTIVE AND OBJECTIVE BOX
[facility]                                                         CARDIOLOGY AT [facility]                                                                  [address], [place] [address]                                                         Telephone: [phone]. Fax:[phone]                                                                             PROGRESS NOTE    Reason for follow up: Saddle PE  Update: Patient underwent mechanical thrombectomy yesterday with improvement in patient's symptoms. Patient with some right groin bleeding overnight, re-evaluated today by Cath NP. Patient's suture removed with manual pressure for 10 minutes. Patient with no active complaints at this time.       Review of symptoms:   Cardiac:  No chest pain. No dyspnea. No palpitations.  Respiratory: no cough. No dyspnea  Gastrointestinal: No diarrhea. No abdominal pain. No bleeding.   Neuro: No focal neuro complaints.    Vitals:  T(C): 36.8 (05-18-23 @ 08:25), Max: 37.1 (05-17-23 @ 15:40)  HR: 76 (05-18-23 @ 07:49) (73 - 97)  BP: 118/82 (05-18-23 @ 07:49) (103/76 - 132/94)  RR: 12 (05-18-23 @ 07:49) (12 - 18)  SpO2: 96% (05-18-23 @ 07:49) (93% - 97%)  Wt(kg): --  I&O's Summary    17 May 2023 07:01  -  18 May 2023 07:00  --------------------------------------------------------  IN: 205 mL / OUT: 1500 mL / NET: -1295 mL      Weight (kg): 68.3 (05-18 @ 01:22), 77.1 (05-16 @ 08:07)    PHYSICAL EXAM:  Appearance: Comfortable. No acute distress  HEENT:  Atraumatic. Normocephalic.  Normal oral mucosa  Neurologic: A & O x 3, no gross focal deficits.  Cardiovascular: RRR S1 S2, No murmur, no rubs/gallops. No JVD  Respiratory: Lungs clear to auscultation, unlabored   Gastrointestinal:  Soft, Non-tender, + BS  Lower Extremities: 2+ Peripheral Pulses, No clubbing, cyanosis, or edema, right groin site with some swelling, no hematoma, no ecchymosis.  Psychiatry: Patient is calm. No agitation.   Skin: warm and dry.    CURRENT CARDIAC MEDICATIONS:  losartan 25 milliGRAM(s) Oral daily      CURRENT OTHER MEDICATIONS:  albuterol    90 MICROgram(s) HFA Inhaler 2 Puff(s) Inhalation every 6 hours PRN Shortness of Breath and/or Wheezing  guaiFENesin Oral Liquid (Sugar-Free) 100 milliGRAM(s) Oral every 6 hours PRN Cough  acetaminophen     Tablet .. 650 milliGRAM(s) Oral every 6 hours PRN Temp greater or equal to 38C (100.4F), Mild Pain (1 - 3), Moderate Pain (4 - 6)  ondansetron Injectable 4 milliGRAM(s) IV Push every 6 hours PRN Nausea and/or Vomiting  ascorbic acid 500 milliGRAM(s) Oral daily  cholecalciferol 1000 Unit(s) Oral daily  heparin   Injectable 2500 Unit(s) IV Push every 6 hours PRN For aPTT between 40 - 57  heparin   Injectable 5500 Unit(s) IV Push every 6 hours PRN For aPTT less than 40  heparin  Infusion. 800 Unit(s)/Hr (8 mL/Hr) IV Continuous <Continuous>      LABS:	 	  ( 17 May 2023 08:17 )  Troponin T  0.02 ,  CPK  X    , CKMB  X    , BNP X        , ( 16 May 2023 15:18 )  Troponin T  0.14<H>,  CPK  X    , CKMB  X    , BNP X        , ( 16 May 2023 13:35 )  Troponin T  X    ,  CPK  51   , CKMB  X    , BNP X                                  13.8   10.21 )-----------( 182      ( 18 May 2023 08:20 )             41.8     05-18    139  |  104  |  19.0  ----------------------------<  141<H>  3.8   |  22.0  |  1.05    Ca    8.1<L>      18 May 2023 04:24  Mg     1.9     05-18    TPro  6.1<L>  /  Alb  3.5  /  TBili  0.6  /  DBili  x   /  AST  18  /  ALT  17  /  AlkPhos  84  05-17    PT/INR/PTT ( 18 May 2023 08:20 )                       :                       :      X            :       152.1                 .        .                   .              .           .       X           .                                       Lipid Profile:   HgA1c:   TSH:     TELEMETRY: SR, ST, PACs  ECG:    DIAGNOSTIC TESTING:  [ ] Echocardiogram:   < from: TTE Echo Complete w/o Contrast w/ Doppler (05.16.23 @ 15:19) >  PHYSICIAN INTERPRETATION:  Left Ventricle: The left ventricular internal cavity size is normal.  Global LV systolic function was normal. Left ventricular ejection   fraction, by visual estimation, is 60 to 65%. Spectral Doppler shows   normal pattern of LV diastolic filling.  Right Ventricle: The right ventricular size is severely enlarged. RV   systolic function is severely reduced. RV apex appears hyperkinetic, with   free wall hypokinesis suggestive of Dougherty's sign.  Left Atrium: Normal left atrial size.  Right Atrium: Mildly enlarged right atrium.  Pericardium: There is no evidence of pericardial effusion.  Mitral Valve: Thickening of the anterior and posterior mitral valve   leaflets. There is mild mitral annular calcification. Trace mitral valve   regurgitation is seen.  Tricuspid Valve: The tricuspid valve is not well seen. Moderate tricuspid   regurgitation is visualized. Estimated pulmonary artery systolic pressure   is 43.3 mmHg assuming a right atrial pressure of 8 mmHg, which is   consistent with mild pulmonary hypertension.  Aortic Valve: The aortic valve was not well visualized. Sclerotic aortic   valve with normal opening. Trivial aortic valve regurgitation is seen.  Pulmonic Valve: The pulmonic valve was not well visualized.  Aorta: Dilated Ao root at 4.0cm.  Pulmonary Artery: The pulmonary artery is not well seen.  Venous: The pulmonary veins were not well visualized. The inferior vena   cava was normal sized, with respiratory size variation less than 50%.  In comparison to the previous echocardiogram(s): There are no prior   studies on this patient for comparison purposes.      Summary:   1. Left ventricular ejection fraction, by visual estimation, is 60 to   65%.   2. Normal global left ventricular systolic function.   3. Severely enlarged right ventricle. Severely reduced RV systolic   function. RV apex appears hyperkinetic, with free wall hypokinesis   suggestive of Dougherty's sign.   4. Mildly enlarged right atrium.   5. Normal left atrial size.   6. Moderate tricuspid regurgitation.   7. Estimated pulmonary artery systolic pressure is 43.3 mmHg assuming a   right atrial pressure of 8 mmHg, which is consistent with mild pulmonary   hypertension.   8. Mild mitral annular calcification.   9. Thickening of the anterior and posterior mitral valve leaflets.  10. Trace mitral valve regurgitation.  11. Sclerotic aortic valve with normal opening.  12. There is no evidence of pericardial effusion.  13. There are no prior studies on this patient for comparison purposes.    Felipa Thomas MD Electronically signed on 5/16/2023 at 4:56:29 PM    < end of copied text >    [ ]  Catheterization:    [ ] Stress Test:    OTHER:

## 2023-05-18 NOTE — PROGRESS NOTE ADULT - ASSESSMENT
A/P: 73 y/o male with hx of varicose veins and HTN presenting to [facility] with complaints of shortness of breath. He was taking the trash out to the curb today and felt weak, SOB, and felt as if he was going to pass out. He went into the house and still felt weak; his wife called an ambulance. Patient feels no symptoms if he isn't doing anything. He had COVID 10 days ago. He was taking Eliquis 2.5 mg BID for superficial GSV thrombosis and below the knee gastrocnemius DVT. He stopped taking Eliquis 4-5 days ago while he had COVID. Patient is transferred from [facility] for saddle PE. Of note, patient is a [employer]. ECHo ef 60% Severely reduced RV systolic function. + Hermosillo's sign trop to 0.14 trended down. Now s/p mechanical thrombectomy.

## 2023-05-18 NOTE — CHART NOTE - NSCHARTNOTEFT_GEN_A_CORE
Called by bedside nurse pt with hematuria   At my arrival pt states asymptomatic    Vital Signs Last 24 Hrs  T(C): 36.7 (18 May 2023 01:22), Max: 37.1 (17 May 2023 15:40)  T(F): 98.1 (18 May 2023 01:22), Max: 98.7 (17 May 2023 15:40)  HR: 84 (18 May 2023 01:22) (66 - 97)  BP: 128/92 (18 May 2023 01:22) (103/76 - 132/94)  BP(mean): 101 (18 May 2023 01:22) (101 - 101)  RR: 18 (18 May 2023 01:22) (16 - 18)  SpO2: 95% (18 May 2023 01:22) (93% - 97%)    Parameters below as of 18 May 2023 01:22  Patient On (Oxygen Delivery Method): room air    Pt is sp mechanical thrombectomy   Heparin was restarted at 1:00  Will order CBC stat and urology consult Called by bedside nurse pt with hematuria   At my arrival pt states asymptomatic    Vital Signs Last 24 Hrs  T(C): 36.7 (18 May 2023 01:22), Max: 37.1 (17 May 2023 15:40)  T(F): 98.1 (18 May 2023 01:22), Max: 98.7 (17 May 2023 15:40)  HR: 84 (18 May 2023 01:22) (66 - 97)  BP: 128/92 (18 May 2023 01:22) (103/76 - 132/94)  BP(mean): 101 (18 May 2023 01:22) (101 - 101)  RR: 18 (18 May 2023 01:22) (16 - 18)  SpO2: 95% (18 May 2023 01:22) (93% - 97%)    Parameters below as of 18 May 2023 01:22  Patient On (Oxygen Delivery Method): room air    Pt is sp mechanical thrombectomy   Heparin was restarted at 1:00  Will order CBC stat     Bedside nurse called back pt bleeding from R groin and developed small hematoma  I manually held pressure x  10 min.  Hematoma resolved however pt continue oozing from R groin. Heparin was dc. Pt tolerated procedure well.. Pulses in the right and left lower extremity are palpable +2.    Awaiting for CBC results     Problem: Hematoma.      Plan:  - Strict bed rest   - Right groin site benign however small oozing is noted  -  + palp pedal pulse. `  - Monitoring of the right groin and both lower extremities including neuro-vascular checks and vital signs. Nurse aware  - R groin post procedural care and instructions reviewed with patient.  - Pt. hemodynamically stable

## 2023-05-18 NOTE — PROGRESS NOTE ADULT - ASSESSMENT
72 year old male ([employer]) with history of spontaneous superficial venous thrombosis, recent LLE DVT (sub optimally treated) followed by Covid 19 infection transferred from \[facility]\"" with acute saddle PE with Rt heart strain. Now s/p mechanical thrombectomy following which he has had groin oozing and hematuria on heparin ggt     Acute cor pulmonale   2/2 large saddle PE s/p mechanical thrombectomy with severe Rt heart strain (McConnel +ve)  Suspect sequale of recent LLE DVT which was suboptimally treated (2.5 mg BID x 1 month)  On heparin ggt, continue at present with groin precautions  Will need hypercoagulable w/u as outpt with primary hematologist     LLE DVT   Large clot burden, likely result of above   Clot formed PRIOR to Covid 19 infection   Continue heparin ggt     Hematuria and groin site bleeding   LV function normal, will give 500 ml NS x 1 to assess for interval change  IR consulted to evaluate for IVC filter as pt is a [employer] and IF unable to complete appropriate AC will not be able to get PRBCs  Start FOlic acid, MTV, B12 and PO iron/ ascorbic acid for now  Repeat H/H Q12 hours for now and if acutely drops will consider giving dose of Retacrit     COVID-19  No respiratory distress  Supportive care  Was unable to tolerate Molnupiravir as an outpatient     HTN  Continue Losartan    DVT Prophylaxis -- Patient is on Heparin Infusion.       72 year old male ([employer]) with history of spontaneous superficial venous thrombosis, recent LLE DVT (sub optimally treated) followed by Covid 19 infection transferred from [facility] with acute saddle PE with Rt heart strain. Now s/p mechanical thrombectomy following which he has had groin oozing and hematuria on heparin ggt     Acute cor pulmonale   2/2 large saddle PE s/p mechanical thrombectomy with severe Rt heart strain (McConnel +ve)  Suspect sequale of recent LLE DVT which was suboptimally treated (2.5 mg BID x 1 month)  On heparin ggt, continue at present with groin precautions  Will need hypercoagulable w/u as outpt with primary hematologist     LLE DVT   Large clot burden, likely result of above   Clot formed PRIOR to Covid 19 infection   Continue heparin ggt     Hematuria and groin site bleeding   LV function normal, will give 500 ml NS x 1 to assess for interval change  IR consulted to evaluate for IVC filter as pt is a [employer] and IF unable to complete appropriate AC will not be able to get PRBCs  Start Folic acid, MTV, B12 and PO iron/ ascorbic acid for now  Repeat H/H Q8 hours for now and if acutely drops will consider giving dose of Retacrit     COVID-19  No respiratory distress  Supportive care  Was unable to tolerate Molnupiravir as an outpatient     HTN  Continue Losartan    DVT Prophylaxis -- Patient is on Heparin Infusion.

## 2023-05-18 NOTE — PROVIDER CONTACT NOTE (CRITICAL VALUE NOTIFICATION) - SITUATION
MAURILIO Stanton made aware that heparin drip turned off as per normogram at 0055 when result called in. Also noted darkened red blood tinged urine, Once on 3TWR around 0100, GERALDINE Blackwell receiving RN made aware of aptt and urine color, will continue to monitor.

## 2023-05-18 NOTE — CHART NOTE - NSCHARTNOTEFT_GEN_A_CORE
Spoke to nurse R groin stable no current bleeding.   CBC stable   Pt needs to be re-start on Heparin since he had massive PE and DVT. The benefits of medical therapy outweigh the risks of bleeding  Heparin restarted. Nurse aware  Please call back if pt starts bleeding again Spoke to nurse R groin stable no current bleeding.   CBC stable   Pt needs to be re-start on Heparin since he had massive PE and DVT. The benefits of medical therapy outweigh the risks of bleeding  Keep PTT close to 60  Heparin restarted. Nurse aware  Please call back if pt starts bleeding again

## 2023-05-18 NOTE — CONSULT NOTE ADULT - SUBJECTIVE AND OBJECTIVE BOX
HPI:  72 year-old male with a history of recent DVT on 2.5mg Eliquis (04/2023) was transferred to [facility] from [facility] for saddle PE. Patient recently diagnosed with covid and viral illness, poor po intake and stopped his Eliquis He subsequently developed SOB and found to have a Saddle PE with right heart strain. Patient underwent [facility] PERT team. Patient started on heparin gtt for further management of PE and DVTs. Hospital course complicated by oozing r groin puncture site and hematuria. Patient is a [facility] IR consulted for IVC filter evaluation.   ============================================================================  Medications:  Home Medications:  losartan 25 mg oral tablet: 1 tab(s) orally once a day (16 May 2023 16:57)    MEDICATIONS  (STANDING):  apixaban 10 milliGRAM(s) Oral every 12 hours  ascorbic acid 500 milliGRAM(s) Oral daily  cholecalciferol 1000 Unit(s) Oral daily  ferrous    sulfate 325 milliGRAM(s) Oral daily  folic acid 1 milliGRAM(s) Oral daily  losartan 25 milliGRAM(s) Oral daily  multivitamin 1 Tablet(s) Oral daily    MEDICATIONS  (PRN):  acetaminophen     Tablet .. 650 milliGRAM(s) Oral every 6 hours PRN Temp greater or equal to 38C (100.4F), Mild Pain (1 - 3), Moderate Pain (4 - 6)  albuterol    90 MICROgram(s) HFA Inhaler 2 Puff(s) Inhalation every 6 hours PRN Shortness of Breath and/or Wheezing  guaiFENesin Oral Liquid (Sugar-Free) 100 milliGRAM(s) Oral every 6 hours PRN Cough  ondansetron Injectable 4 milliGRAM(s) IV Push every 6 hours PRN Nausea and/or Vomiting      Allergies:   morphine (Hives)    ============================================================================  PAST MEDICAL & SURGICAL HISTORY:  SVT (supraventricular tachycardia)      Acute deep vein thrombosis (DVT) of left lower extremity      2019 novel coronavirus disease (COVID-19)      HTN (hypertension)      S/P appendectomy          FAMILY HISTORY:  FH: prostate cancer (Father)        Social History:      ============================================================================  Vitals:  Vital Signs Last 24 Hrs  T(C): 36.9 (18 May 2023 15:35), Max: 36.9 (18 May 2023 15:35)  T(F): 98.5 (18 May 2023 15:35), Max: 98.5 (18 May 2023 15:35)  HR: 96 (18 May 2023 16:00) (73 - 97)  BP: 117/89 (18 May 2023 16:00) (103/76 - 132/94)  BP(mean): 95 (18 May 2023 16:00) (84 - 101)  RR: 18 (18 May 2023 16:00) (12 - 19)  SpO2: 96% (18 May 2023 16:00) (93% - 97%)    Parameters below as of 18 May 2023 16:00  Patient On (Oxygen Delivery Method): room air    Labs:                        13.9   10.05 )-----------( 192      ( 18 May 2023 12:28 )             42.6     05-18    139  |  104  |  19.0  ----------------------------<  141<H>  3.8   |  22.0  |  1.05    Ca    8.1<L>      18 May 2023 04:24  Mg     1.9     05-18    TPro  6.1<L>  /  Alb  3.5  /  TBili  0.6  /  DBili  x   /  AST  18  /  ALT  17  /  AlkPhos  84  05-17    PTT - ( 18 May 2023 12:20 )  PTT:55.0 sec    Imaging:   Pertinent Imaging Reviewed.    ============================================================================

## 2023-05-19 LAB
ANION GAP SERPL CALC-SCNC: 10 MMOL/L — SIGNIFICANT CHANGE UP (ref 5–17)
BUN SERPL-MCNC: 16.6 MG/DL — SIGNIFICANT CHANGE UP (ref 8–20)
CALCIUM SERPL-MCNC: 8.3 MG/DL — LOW (ref 8.4–10.5)
CHLORIDE SERPL-SCNC: 104 MMOL/L — SIGNIFICANT CHANGE UP (ref 96–108)
CO2 SERPL-SCNC: 25 MMOL/L — SIGNIFICANT CHANGE UP (ref 22–29)
CREAT SERPL-MCNC: 0.89 MG/DL — SIGNIFICANT CHANGE UP (ref 0.5–1.3)
EGFR: 90 ML/MIN/1.73M2 — SIGNIFICANT CHANGE UP
GLUCOSE BLDC GLUCOMTR-MCNC: 125 MG/DL — HIGH (ref 70–99)
GLUCOSE SERPL-MCNC: 109 MG/DL — HIGH (ref 70–99)
HCT VFR BLD CALC: 35.2 % — LOW (ref 39–50)
HCT VFR BLD CALC: 39.6 % — SIGNIFICANT CHANGE UP (ref 39–50)
HGB BLD-MCNC: 12 G/DL — LOW (ref 13–17)
HGB BLD-MCNC: 13.2 G/DL — SIGNIFICANT CHANGE UP (ref 13–17)
MAGNESIUM SERPL-MCNC: 2 MG/DL — SIGNIFICANT CHANGE UP (ref 1.6–2.6)
MCHC RBC-ENTMCNC: 29.2 PG — SIGNIFICANT CHANGE UP (ref 27–34)
MCHC RBC-ENTMCNC: 33.3 GM/DL — SIGNIFICANT CHANGE UP (ref 32–36)
MCV RBC AUTO: 87.6 FL — SIGNIFICANT CHANGE UP (ref 80–100)
PLATELET # BLD AUTO: 186 K/UL — SIGNIFICANT CHANGE UP (ref 150–400)
POTASSIUM SERPL-MCNC: 4.3 MMOL/L — SIGNIFICANT CHANGE UP (ref 3.5–5.3)
POTASSIUM SERPL-SCNC: 4.3 MMOL/L — SIGNIFICANT CHANGE UP (ref 3.5–5.3)
RBC # BLD: 4.52 M/UL — SIGNIFICANT CHANGE UP (ref 4.2–5.8)
RBC # FLD: 13.5 % — SIGNIFICANT CHANGE UP (ref 10.3–14.5)
SODIUM SERPL-SCNC: 139 MMOL/L — SIGNIFICANT CHANGE UP (ref 135–145)
WBC # BLD: 8.69 K/UL — SIGNIFICANT CHANGE UP (ref 3.8–10.5)
WBC # FLD AUTO: 8.69 K/UL — SIGNIFICANT CHANGE UP (ref 3.8–10.5)

## 2023-05-19 PROCEDURE — 99232 SBSQ HOSP IP/OBS MODERATE 35: CPT

## 2023-05-19 PROCEDURE — 99233 SBSQ HOSP IP/OBS HIGH 50: CPT

## 2023-05-19 PROCEDURE — 74176 CT ABD & PELVIS W/O CONTRAST: CPT | Mod: 26

## 2023-05-19 RX ORDER — TAMSULOSIN HYDROCHLORIDE 0.4 MG/1
0.4 CAPSULE ORAL AT BEDTIME
Refills: 0 | Status: DISCONTINUED | OUTPATIENT
Start: 2023-05-20 | End: 2023-05-22

## 2023-05-19 RX ORDER — SODIUM CHLORIDE 9 MG/ML
500 INJECTION INTRAMUSCULAR; INTRAVENOUS; SUBCUTANEOUS ONCE
Refills: 0 | Status: COMPLETED | OUTPATIENT
Start: 2023-05-19 | End: 2023-05-19

## 2023-05-19 RX ORDER — POLYETHYLENE GLYCOL 3350 17 G/17G
17 POWDER, FOR SOLUTION ORAL ONCE
Refills: 0 | Status: COMPLETED | OUTPATIENT
Start: 2023-05-19 | End: 2023-05-19

## 2023-05-19 RX ORDER — SODIUM CHLORIDE 9 MG/ML
500 INJECTION INTRAMUSCULAR; INTRAVENOUS; SUBCUTANEOUS
Refills: 0 | Status: DISCONTINUED | OUTPATIENT
Start: 2023-05-19 | End: 2023-05-22

## 2023-05-19 RX ORDER — TAMSULOSIN HYDROCHLORIDE 0.4 MG/1
0.8 CAPSULE ORAL AT BEDTIME
Refills: 0 | Status: COMPLETED | OUTPATIENT
Start: 2023-05-19 | End: 2023-05-19

## 2023-05-19 RX ADMIN — POLYETHYLENE GLYCOL 3350 17 GRAM(S): 17 POWDER, FOR SOLUTION ORAL at 11:53

## 2023-05-19 RX ADMIN — SODIUM CHLORIDE 100 MILLILITER(S): 9 INJECTION INTRAMUSCULAR; INTRAVENOUS; SUBCUTANEOUS at 09:14

## 2023-05-19 RX ADMIN — APIXABAN 10 MILLIGRAM(S): 2.5 TABLET, FILM COATED ORAL at 05:03

## 2023-05-19 RX ADMIN — Medication 1000 UNIT(S): at 11:47

## 2023-05-19 RX ADMIN — Medication 10 MILLIGRAM(S): at 11:53

## 2023-05-19 RX ADMIN — Medication 650 MILLIGRAM(S): at 13:42

## 2023-05-19 RX ADMIN — Medication 1 TABLET(S): at 11:47

## 2023-05-19 RX ADMIN — Medication 650 MILLIGRAM(S): at 14:12

## 2023-05-19 RX ADMIN — APIXABAN 10 MILLIGRAM(S): 2.5 TABLET, FILM COATED ORAL at 17:32

## 2023-05-19 RX ADMIN — SODIUM CHLORIDE 250 MILLILITER(S): 9 INJECTION INTRAMUSCULAR; INTRAVENOUS; SUBCUTANEOUS at 18:56

## 2023-05-19 RX ADMIN — Medication 1 MILLIGRAM(S): at 11:47

## 2023-05-19 RX ADMIN — TAMSULOSIN HYDROCHLORIDE 0.8 MILLIGRAM(S): 0.4 CAPSULE ORAL at 22:01

## 2023-05-19 RX ADMIN — Medication 500 MILLIGRAM(S): at 11:53

## 2023-05-19 RX ADMIN — LOSARTAN POTASSIUM 25 MILLIGRAM(S): 100 TABLET, FILM COATED ORAL at 05:04

## 2023-05-19 RX ADMIN — Medication 325 MILLIGRAM(S): at 11:47

## 2023-05-19 NOTE — PROGRESS NOTE ADULT - ASSESSMENT
A/P: 71 y/o male with hx of varicose veins and HTN presenting to [facility] with complaints of shortness of breath. He was taking the trash out to the curb today and felt weak, SOB, and felt as if he was going to pass out. He went into the house and still felt weak; his wife called an ambulance. Patient feels no symptoms if he isn't doing anything. He had COVID 10 days ago. He was taking Eliquis 2.5 mg BID for superficial GSV thrombosis and below the knee gastrocnemius DVT. He stopped taking Eliquis 4-5 days ago while he had COVID. Patient is transferred from [facility] for saddle PE. Of note, patient is a [employer]. ECHo ef 60% Severely reduced RV systolic function. + Hermosillo's sign trop to 0.14 trended down. Now s/p mechanical thrombectomy.

## 2023-05-19 NOTE — PROGRESS NOTE ADULT - SUBJECTIVE AND OBJECTIVE BOX
[facility] Division of Hospital Medicine    Chief Complaint:  Saddle PE    SUBJECTIVE / OVERNIGHT EVENTS:  Pt examined lying in bed  Hematuria appears to be clearing however complains of lower abd pain   Patient denies chest pain, SOB, abd pain, N/V, fever, chills, dysuria or any other complaints. All remainder ROS negative.     MEDICATIONS  (STANDING):  ascorbic acid 500 milliGRAM(s) Oral daily  cholecalciferol 1000 Unit(s) Oral daily  ferrous    sulfate 325 milliGRAM(s) Oral daily  folic acid 1 milliGRAM(s) Oral daily  heparin  Infusion. 800 Unit(s)/Hr (8 mL/Hr) IV Continuous <Continuous>  losartan 25 milliGRAM(s) Oral daily  multivitamin 1 Tablet(s) Oral daily    MEDICATIONS  (PRN):  acetaminophen     Tablet .. 650 milliGRAM(s) Oral every 6 hours PRN Temp greater or equal to 38C (100.4F), Mild Pain (1 - 3), Moderate Pain (4 - 6)  albuterol    90 MICROgram(s) HFA Inhaler 2 Puff(s) Inhalation every 6 hours PRN Shortness of Breath and/or Wheezing  guaiFENesin Oral Liquid (Sugar-Free) 100 milliGRAM(s) Oral every 6 hours PRN Cough  heparin   Injectable 2500 Unit(s) IV Push every 6 hours PRN For aPTT between 40 - 57  heparin   Injectable 5500 Unit(s) IV Push every 6 hours PRN For aPTT less than 40  ondansetron Injectable 4 milliGRAM(s) IV Push every 6 hours PRN Nausea and/or Vomiting        PHYSICAL EXAM:  Vital Signs Last 24 Hrs  T(C): 36.9 (19 May 2023 15:55), Max: 37 (18 May 2023 20:00)  T(F): 98.5 (19 May 2023 15:55), Max: 98.6 (18 May 2023 20:00)  HR: 92 (19 May 2023 16:00) (60 - 92)  BP: 120/77 (19 May 2023 16:00) (111/80 - 139/98)  BP(mean): 87 (19 May 2023 16:00) (86 - 108)  RR: 18 (19 May 2023 16:00) (14 - 18)  SpO2: 94% (19 May 2023 16:00) (94% - 96%)    Parameters below as of 19 May 2023 16:00  Patient On (Oxygen Delivery Method): room air        CONSTITUTIONAL: Non toxic appearing, lying in bed  ENMT: Moist oral mucosa, no pharyngeal injection or exudates; normal dentition  RESPIRATORY: Normal respiratory effort; lungs are clear to auscultation bilaterally  CARDIOVASCULAR: Regular rate and rhythm, normal S1 and S2, no murmur/rub/gallop; No lower extremity edema; Peripheral pulses are 2+ bilaterally  ABDOMEN: Nontender to palpation, normoactive bowel sounds, no rebound/guarding; No hepatosplenomegaly  MUSCLOSKELETAL:  Rt groin site small hematoma, no active oozing at time of exam,  no clubbing or cyanosis of digits; no joint swelling or tenderness to palpation  PSYCH: A+O to person, place, and time; affect appropriate  NEUROLOGY: CN 2-12 are intact and symmetric; no gross sensory deficits;   SKIN: No rashes; no palpable lesions    LABS:                        13.9   10.05 )-----------( 192      ( 18 May 2023 12:28 )             42.6     05-18    139  |  104  |  19.0  ----------------------------<  141<H>  3.8   |  22.0  |  1.05    Ca    8.1<L>      18 May 2023 04:24  Mg     1.9     05-18    TPro  6.1<L>  /  Alb  3.5  /  TBili  0.6  /  DBili  x   /  AST  18  /  ALT  17  /  AlkPhos  84  05-17    PTT - ( 18 May 2023 12:20 )  PTT:55.0 sec  CARDIAC MARKERS ( 17 May 2023 08:17 )  x     / 0.02 ng/mL / x     / x     / x      CARDIAC MARKERS ( 16 May 2023 15:18 )  x     / 0.14 ng/mL / x     / x     / x              CAPILLARY BLOOD GLUCOSE            RADIOLOGY & ADDITIONAL TESTS:  CT Abd/pel   Mild asymmetric expansion and hyperdensity of the right psoas muscle suggestive of intramuscular hemorrhage.   No retroperitoneal hemorrhage.   8 mm left ureterovesical junction calculus causing mild left hydroureteronephrosis and stranding.

## 2023-05-19 NOTE — PROGRESS NOTE ADULT - SUBJECTIVE AND OBJECTIVE BOX
[facility]                                                         CARDIOLOGY AT [address], [place] [address]                                                         Telephone: [phone]. Fax:[phone]                                                                             PROGRESS NOTE    Reason for follow up: Saddle PE  Update: Patient still with hematuria and some constipation. Patient's H/H is currently stable. Patient's breathing is stable as well.       Review of symptoms:   Cardiac:  No chest pain. No dyspnea. No palpitations.  Respiratory: no cough. No dyspnea  Gastrointestinal: No diarrhea. No abdominal pain. No bleeding.   Neuro: No focal neuro complaints.    Vitals:  T(C): 36.9 (05-19-23 @ 08:24), Max: 37 (05-18-23 @ 20:00)  HR: 80 (05-19-23 @ 08:00) (60 - 96)  BP: 134/90 (05-19-23 @ 08:00) (111/80 - 134/90)  RR: 18 (05-19-23 @ 08:00) (16 - 19)  SpO2: 95% (05-19-23 @ 08:00) (95% - 96%)  Wt(kg): --  I&O's Summary    18 May 2023 07:01  -  19 May 2023 07:00  --------------------------------------------------------  IN: 822 mL / OUT: 820 mL / NET: 2 mL      Weight (kg): 68.3 (05-18 @ 01:22), 77.1 (05-16 @ 08:07)    PHYSICAL EXAM:  Appearance: Comfortable. No acute distress  HEENT:  Atraumatic. Normocephalic.  Normal oral mucosa  Neurologic: A & O x 3, no gross focal deficits.  Cardiovascular: RRR S1 S2, No murmur, no rubs/gallops. No JVD  Respiratory: Lungs clear to auscultation, unlabored   Gastrointestinal:  Soft, Non-tender, + BS  Lower Extremities: 2+ Peripheral Pulses, No clubbing, cyanosis, or edema, right groin with some ecchymosis  Psychiatry: Patient is calm. No agitation.   Skin: warm and dry.    CURRENT CARDIAC MEDICATIONS:  losartan 25 milliGRAM(s) Oral daily      CURRENT OTHER MEDICATIONS:  albuterol    90 MICROgram(s) HFA Inhaler 2 Puff(s) Inhalation every 6 hours PRN Shortness of Breath and/or Wheezing  guaiFENesin Oral Liquid (Sugar-Free) 100 milliGRAM(s) Oral every 6 hours PRN Cough  acetaminophen     Tablet .. 650 milliGRAM(s) Oral every 6 hours PRN Temp greater or equal to 38C (100.4F), Mild Pain (1 - 3), Moderate Pain (4 - 6)  ondansetron Injectable 4 milliGRAM(s) IV Push every 6 hours PRN Nausea and/or Vomiting  apixaban 10 milliGRAM(s) Oral every 12 hours, Stop order after: 14 Doses  ascorbic acid 500 milliGRAM(s) Oral daily  cholecalciferol 1000 Unit(s) Oral daily  ferrous    sulfate 325 milliGRAM(s) Oral daily  folic acid 1 milliGRAM(s) Oral daily  multivitamin 1 Tablet(s) Oral daily      LABS:	 	  ( 17 May 2023 08:17 )  Troponin T  0.02 ,  CPK  X    , CKMB  X    , BNP X        , ( 16 May 2023 15:18 )  Troponin T  0.14<H>,  CPK  X    , CKMB  X    , BNP X        , ( 16 May 2023 13:35 )  Troponin T  X    ,  CPK  51   , CKMB  X    , BNP X                                  13.2   8.69  )-----------( 186      ( 19 May 2023 06:40 )             39.6     05-19    139  |  104  |  16.6  ----------------------------<  109<H>  4.3   |  25.0  |  0.89    Ca    8.3<L>      19 May 2023 06:40  Mg     2.0     05-19      PT/INR/PTT ( 18 May 2023 21:28 )                       :                       :      X            :       26.3                  .        .                   .              .           .       X           .                                       Lipid Profile:   HgA1c:   TSH:     TELEMETRY: SR  ECG:    DIAGNOSTIC TESTING:  [ ] Echocardiogram:   < from: TTE Echo Complete w/o Contrast w/ Doppler (05.16.23 @ 15:19) >  PHYSICIAN INTERPRETATION:  Left Ventricle: The left ventricular internal cavity size is normal.  Global LV systolic function was normal. Left ventricular ejection   fraction, by visual estimation, is 60 to 65%. Spectral Doppler shows   normal pattern of LV diastolic filling.  Right Ventricle: The right ventricular size is severely enlarged. RV   systolic function is severely reduced. RV apex appears hyperkinetic, with   free wall hypokinesis suggestive of Dougherty's sign.  Left Atrium: Normal left atrial size.  Right Atrium: Mildly enlarged right atrium.  Pericardium: There is no evidence of pericardial effusion.  Mitral Valve: Thickening of the anterior and posterior mitral valve   leaflets. There is mild mitral annular calcification. Trace mitral valve   regurgitation is seen.  Tricuspid Valve: The tricuspid valve is not well seen. Moderate tricuspid   regurgitation is visualized. Estimated pulmonary artery systolic pressure   is 43.3 mmHg assuming a right atrial pressure of 8 mmHg, which is   consistent with mild pulmonary hypertension.  Aortic Valve: The aortic valve was not well visualized. Sclerotic aortic   valve with normal opening. Trivial aortic valve regurgitation is seen.  Pulmonic Valve: The pulmonic valve was not well visualized.  Aorta: Dilated Ao root at 4.0cm.  Pulmonary Artery: The pulmonary artery is not well seen.  Venous: The pulmonary veins were not well visualized. The inferior vena   cava was normal sized, with respiratory size variation less than 50%.  In comparison to the previous echocardiogram(s): There are no prior   studies on this patient for comparison purposes.      Summary:   1. Left ventricular ejection fraction, by visual estimation, is 60 to   65%.   2. Normal global left ventricular systolic function.   3. Severely enlarged right ventricle. Severely reduced RV systolic   function. RV apex appears hyperkinetic, with free wall hypokinesis   suggestive of Dougherty's sign.   4. Mildly enlarged right atrium.   5. Normal left atrial size.   6. Moderate tricuspid regurgitation.   7. Estimated pulmonary artery systolic pressure is 43.3 mmHg assuming a   right atrial pressure of 8 mmHg, which is consistent with mild pulmonary   hypertension.   8. Mild mitral annular calcification.   9. Thickening of the anterior and posterior mitral valve leaflets.  10. Trace mitral valve regurgitation.  11. Sclerotic aortic valve with normal opening.  12. There is no evidence of pericardial effusion.  13. There are no prior studies on this patient for comparison purposes.    Felipa [place] MD Electronically signed on 5/16/2023 at 4:56:29 PM    < end of copied text >                                                                  [facility]                                                         CARDIOLOGY AT [address], [place] [address]                                                         Telephone: [phone]. Fax:[phone]                                                                             PROGRESS NOTE    Reason for follow up: Saddle PE  Update: Patient still with hematuria and some constipation/ abdominal pain. Patient's H/H is currently stable. Patient's breathing is stable as well.       Review of symptoms:   Cardiac:  No chest pain. No dyspnea. No palpitations.  Respiratory: no cough. No dyspnea  Gastrointestinal: No diarrhea. No abdominal pain. No bleeding.   Neuro: No focal neuro complaints.    Vitals:  T(C): 36.9 (05-19-23 @ 08:24), Max: 37 (05-18-23 @ 20:00)  HR: 80 (05-19-23 @ 08:00) (60 - 96)  BP: 134/90 (05-19-23 @ 08:00) (111/80 - 134/90)  RR: 18 (05-19-23 @ 08:00) (16 - 19)  SpO2: 95% (05-19-23 @ 08:00) (95% - 96%)  Wt(kg): --  I&O's Summary    18 May 2023 07:01  -  19 May 2023 07:00  --------------------------------------------------------  IN: 822 mL / OUT: 820 mL / NET: 2 mL      Weight (kg): 68.3 (05-18 @ 01:22), 77.1 (05-16 @ 08:07)    PHYSICAL EXAM:  Appearance: Comfortable. No acute distress  HEENT:  Atraumatic. Normocephalic.  Normal oral mucosa  Neurologic: A & O x 3, no gross focal deficits.  Cardiovascular: RRR S1 S2, No murmur, no rubs/gallops. No JVD  Respiratory: Lungs clear to auscultation, unlabored   Gastrointestinal:  Soft, Non-tender, + BS  Lower Extremities: 2+ Peripheral Pulses, No clubbing, cyanosis, or edema, right groin with some ecchymosis  Psychiatry: Patient is calm. No agitation.   Skin: warm and dry.    CURRENT CARDIAC MEDICATIONS:  losartan 25 milliGRAM(s) Oral daily      CURRENT OTHER MEDICATIONS:  albuterol    90 MICROgram(s) HFA Inhaler 2 Puff(s) Inhalation every 6 hours PRN Shortness of Breath and/or Wheezing  guaiFENesin Oral Liquid (Sugar-Free) 100 milliGRAM(s) Oral every 6 hours PRN Cough  acetaminophen     Tablet .. 650 milliGRAM(s) Oral every 6 hours PRN Temp greater or equal to 38C (100.4F), Mild Pain (1 - 3), Moderate Pain (4 - 6)  ondansetron Injectable 4 milliGRAM(s) IV Push every 6 hours PRN Nausea and/or Vomiting  apixaban 10 milliGRAM(s) Oral every 12 hours, Stop order after: 14 Doses  ascorbic acid 500 milliGRAM(s) Oral daily  cholecalciferol 1000 Unit(s) Oral daily  ferrous    sulfate 325 milliGRAM(s) Oral daily  folic acid 1 milliGRAM(s) Oral daily  multivitamin 1 Tablet(s) Oral daily      LABS:	 	  ( 17 May 2023 08:17 )  Troponin T  0.02 ,  CPK  X    , CKMB  X    , BNP X        , ( 16 May 2023 15:18 )  Troponin T  0.14<H>,  CPK  X    , CKMB  X    , BNP X        , ( 16 May 2023 13:35 )  Troponin T  X    ,  CPK  51   , CKMB  X    , BNP X                                  13.2   8.69  )-----------( 186      ( 19 May 2023 06:40 )             39.6     05-19    139  |  104  |  16.6  ----------------------------<  109<H>  4.3   |  25.0  |  0.89    Ca    8.3<L>      19 May 2023 06:40  Mg     2.0     05-19      PT/INR/PTT ( 18 May 2023 21:28 )                       :                       :      X            :       26.3                  .        .                   .              .           .       X           .                                       Lipid Profile:   HgA1c:   TSH:     TELEMETRY: SR  ECG:    DIAGNOSTIC TESTING:  [ ] Echocardiogram:   < from: TTE Echo Complete w/o Contrast w/ Doppler (05.16.23 @ 15:19) >  PHYSICIAN INTERPRETATION:  Left Ventricle: The left ventricular internal cavity size is normal.  Global LV systolic function was normal. Left ventricular ejection   fraction, by visual estimation, is 60 to 65%. Spectral Doppler shows   normal pattern of LV diastolic filling.  Right Ventricle: The right ventricular size is severely enlarged. RV   systolic function is severely reduced. RV apex appears hyperkinetic, with   free wall hypokinesis suggestive of Dougherty's sign.  Left Atrium: Normal left atrial size.  Right Atrium: Mildly enlarged right atrium.  Pericardium: There is no evidence of pericardial effusion.  Mitral Valve: Thickening of the anterior and posterior mitral valve   leaflets. There is mild mitral annular calcification. Trace mitral valve   regurgitation is seen.  Tricuspid Valve: The tricuspid valve is not well seen. Moderate tricuspid   regurgitation is visualized. Estimated pulmonary artery systolic pressure   is 43.3 mmHg assuming a right atrial pressure of 8 mmHg, which is   consistent with mild pulmonary hypertension.  Aortic Valve: The aortic valve was not well visualized. Sclerotic aortic   valve with normal opening. Trivial aortic valve regurgitation is seen.  Pulmonic Valve: The pulmonic valve was not well visualized.  Aorta: Dilated Ao root at 4.0cm.  Pulmonary Artery: The pulmonary artery is not well seen.  Venous: The pulmonary veins were not well visualized. The inferior vena   cava was normal sized, with respiratory size variation less than 50%.  In comparison to the previous echocardiogram(s): There are no prior   studies on this patient for comparison purposes.      Summary:   1. Left ventricular ejection fraction, by visual estimation, is 60 to   65%.   2. Normal global left ventricular systolic function.   3. Severely enlarged right ventricle. Severely reduced RV systolic   function. RV apex appears hyperkinetic, with free wall hypokinesis   suggestive of Dougherty's sign.   4. Mildly enlarged right atrium.   5. Normal left atrial size.   6. Moderate tricuspid regurgitation.   7. Estimated pulmonary artery systolic pressure is 43.3 mmHg assuming a   right atrial pressure of 8 mmHg, which is consistent with mild pulmonary   hypertension.   8. Mild mitral annular calcification.   9. Thickening of the anterior and posterior mitral valve leaflets.  10. Trace mitral valve regurgitation.  11. Sclerotic aortic valve with normal opening.  12. There is no evidence of pericardial effusion.  13. There are no prior studies on this patient for comparison purposes.    Felipa Thomas MD Electronically signed on 5/16/2023 at 4:56:29 PM    < end of copied text >

## 2023-05-19 NOTE — PROGRESS NOTE ADULT - ASSESSMENT
72 year old male ([employer]) with history of spontaneous superficial venous thrombosis, recent LLE DVT (sub optimally treated) followed by Covid 19 infection transferred from [facility] with acute saddle PE with Rt heart strain. Now s/p mechanical thrombectomy following which he has had groin oozing and hematuria on heparin ggt. Transitioned     Acute cor pulmonale   2/2 large saddle PE s/p mechanical thrombectomy with severe Rt heart strain (McConnel +ve)  Suspect sequale of recent LLE DVT which was suboptimally treated (2.5 mg BID x 1 month)  Heparin ggt transitioned to PO eliquis loading dose  Will need hypercoagulable w/u as outpt with primary hematologist     Abd pain,   Given risks involved with AC and inability to use blood products CT imaging done   Confirms presence of small suspected psoas bleed,   H/H stable, repeat pending  Consider repeating CT in 24-48 hours to ensure stability if symptoms persist      LLE DVT   Large clot burden, likely result of above   Clot formed PRIOR to Covid 19 infection   Continue heparin ggt     Hematuria and groin site bleeding   LV function normal, will give 500 ml NS x 1 to assess for interval change  IR consulted to evaluate for IVC filter as pt is a [employer] and IF unable to complete appropriate AC will not be able to get PRBCs. No plan at present, IF hb gradually drifts down to 9 range then would consider IVC placement as may need to stop AC   Continue Folic acid, MTV, B12 and PO iron/ ascorbic acid for now  Repeat H/H Q12  hours for now and if acutely drops will consider giving dose of Retacrit   Start FLomax 0.8 mg tonight followed by 04 mg QHS from tomorrow   Urinary straining for stone   Urology consulted to assess given 0.8 mm (close to cut off)     COVID-19  No respiratory distress  Supportive care  Was unable to tolerate Molnupiravir as an outpatient     HTN  Continue Losartan    DVT Prophylaxis : Already has DVT, continue Eliquis as above

## 2023-05-20 LAB
ANION GAP SERPL CALC-SCNC: 9 MMOL/L — SIGNIFICANT CHANGE UP (ref 5–17)
BUN SERPL-MCNC: 17.8 MG/DL — SIGNIFICANT CHANGE UP (ref 8–20)
CALCIUM SERPL-MCNC: 7.9 MG/DL — LOW (ref 8.4–10.5)
CHLORIDE SERPL-SCNC: 103 MMOL/L — SIGNIFICANT CHANGE UP (ref 96–108)
CO2 SERPL-SCNC: 23 MMOL/L — SIGNIFICANT CHANGE UP (ref 22–29)
CREAT SERPL-MCNC: 0.92 MG/DL — SIGNIFICANT CHANGE UP (ref 0.5–1.3)
EGFR: 88 ML/MIN/1.73M2 — SIGNIFICANT CHANGE UP
GLUCOSE SERPL-MCNC: 110 MG/DL — HIGH (ref 70–99)
HCT VFR BLD CALC: 32.1 % — LOW (ref 39–50)
HGB BLD-MCNC: 11.1 G/DL — LOW (ref 13–17)
MCHC RBC-ENTMCNC: 29.8 PG — SIGNIFICANT CHANGE UP (ref 27–34)
MCHC RBC-ENTMCNC: 34.6 GM/DL — SIGNIFICANT CHANGE UP (ref 32–36)
MCV RBC AUTO: 86.3 FL — SIGNIFICANT CHANGE UP (ref 80–100)
PLATELET # BLD AUTO: 167 K/UL — SIGNIFICANT CHANGE UP (ref 150–400)
POTASSIUM SERPL-MCNC: 3.8 MMOL/L — SIGNIFICANT CHANGE UP (ref 3.5–5.3)
POTASSIUM SERPL-SCNC: 3.8 MMOL/L — SIGNIFICANT CHANGE UP (ref 3.5–5.3)
RBC # BLD: 3.72 M/UL — LOW (ref 4.2–5.8)
RBC # FLD: 13.2 % — SIGNIFICANT CHANGE UP (ref 10.3–14.5)
SODIUM SERPL-SCNC: 135 MMOL/L — SIGNIFICANT CHANGE UP (ref 135–145)
WBC # BLD: 11.06 K/UL — HIGH (ref 3.8–10.5)
WBC # FLD AUTO: 11.06 K/UL — HIGH (ref 3.8–10.5)

## 2023-05-20 PROCEDURE — 99233 SBSQ HOSP IP/OBS HIGH 50: CPT

## 2023-05-20 PROCEDURE — 99232 SBSQ HOSP IP/OBS MODERATE 35: CPT

## 2023-05-20 RX ORDER — APIXABAN 2.5 MG/1
10 TABLET, FILM COATED ORAL EVERY 12 HOURS
Refills: 0 | Status: DISCONTINUED | OUTPATIENT
Start: 2023-05-20 | End: 2023-05-22

## 2023-05-20 RX ADMIN — APIXABAN 10 MILLIGRAM(S): 2.5 TABLET, FILM COATED ORAL at 06:07

## 2023-05-20 RX ADMIN — TAMSULOSIN HYDROCHLORIDE 0.4 MILLIGRAM(S): 0.4 CAPSULE ORAL at 21:18

## 2023-05-20 RX ADMIN — Medication 650 MILLIGRAM(S): at 21:18

## 2023-05-20 RX ADMIN — Medication 1 TABLET(S): at 11:57

## 2023-05-20 RX ADMIN — Medication 650 MILLIGRAM(S): at 22:00

## 2023-05-20 RX ADMIN — Medication 1 MILLIGRAM(S): at 11:57

## 2023-05-20 RX ADMIN — Medication 1000 UNIT(S): at 11:59

## 2023-05-20 RX ADMIN — Medication 325 MILLIGRAM(S): at 11:59

## 2023-05-20 RX ADMIN — APIXABAN 10 MILLIGRAM(S): 2.5 TABLET, FILM COATED ORAL at 17:17

## 2023-05-20 RX ADMIN — LOSARTAN POTASSIUM 25 MILLIGRAM(S): 100 TABLET, FILM COATED ORAL at 06:07

## 2023-05-20 RX ADMIN — Medication 500 MILLIGRAM(S): at 11:59

## 2023-05-20 NOTE — PROGRESS NOTE ADULT - SUBJECTIVE AND OBJECTIVE BOX
Hospitalist Daily Progress Note    Chief Complaint:  Patient is a 72y old  Male who presents with a chief complaint of Saddle PE (20 May 2023 10:57)      SUBJECTIVE / OVERNIGHT EVENTS:  Patient was seen and examined at bedside. States to be feeling better. No active complaints offered.   Patient denies chest pain, SOB, abd pain, N/V, fever, chills, dysuria or any other complaints. All remainder ROS negative.     MEDICATIONS  (STANDING):  apixaban 10 milliGRAM(s) Oral every 12 hours  ascorbic acid 500 milliGRAM(s) Oral daily  cholecalciferol 1000 Unit(s) Oral daily  ferrous    sulfate 325 milliGRAM(s) Oral daily  folic acid 1 milliGRAM(s) Oral daily  losartan 25 milliGRAM(s) Oral daily  multivitamin 1 Tablet(s) Oral daily  sodium chloride 0.9%. 500 milliLiter(s) (100 mL/Hr) IV Continuous <Continuous>  tamsulosin 0.4 milliGRAM(s) Oral at bedtime    MEDICATIONS  (PRN):  acetaminophen     Tablet .. 650 milliGRAM(s) Oral every 6 hours PRN Temp greater or equal to 38C (100.4F), Mild Pain (1 - 3), Moderate Pain (4 - 6)  albuterol    90 MICROgram(s) HFA Inhaler 2 Puff(s) Inhalation every 6 hours PRN Shortness of Breath and/or Wheezing  guaiFENesin Oral Liquid (Sugar-Free) 100 milliGRAM(s) Oral every 6 hours PRN Cough  ondansetron Injectable 4 milliGRAM(s) IV Push every 6 hours PRN Nausea and/or Vomiting        I&O's Summary    19 May 2023 07:01  -  20 May 2023 07:00  --------------------------------------------------------  IN: 300 mL / OUT: 970 mL / NET: -670 mL    20 May 2023 07:01  -  20 May 2023 11:33  --------------------------------------------------------  IN: 0 mL / OUT: 300 mL / NET: -300 mL        PHYSICAL EXAM:  Vital Signs Last 24 Hrs  T(C): 37 (20 May 2023 07:36), Max: 37.3 (20 May 2023 00:00)  T(F): 98.6 (20 May 2023 07:36), Max: 99.1 (20 May 2023 00:00)  HR: 85 (20 May 2023 08:00) (77 - 92)  BP: 116/80 (20 May 2023 08:00) (103/65 - 130/89)  BP(mean): 89 (20 May 2023 08:00) (74 - 100)  RR: 16 (20 May 2023 08:00) (14 - 18)  SpO2: 94% (20 May 2023 08:00) (94% - 96%)    Parameters below as of 20 May 2023 08:00  Patient On (Oxygen Delivery Method): room air          Constitutional: NAD, Resting  ENT: Supple, No JVD  Lungs: CTA B/L, Non-labored breathing  Cardio: RRR, S1/S2, No murmur  Abdomen: Soft, Nontender, Nondistended; Bowel sounds present  Extremities: No calf tenderness, No pitting edema  Musculoskeletal:   No joint swelling  Psych: Calm, cooperative affect appropriate  Neuro: Awake and alert, oriented x 4, moves all extremities  Skin: No rashes; no palpable lesions    LABS:                        11.1   11.06 )-----------( 167      ( 20 May 2023 06:50 )             32.1     05-20    135  |  103  |  17.8  ----------------------------<  110<H>  3.8   |  23.0  |  0.92    Ca    7.9<L>      20 May 2023 06:50  Mg     2.0     05-19      PTT - ( 18 May 2023 21:28 )  PTT:26.3 sec          CAPILLARY BLOOD GLUCOSE            RADIOLOGY REVIEWED

## 2023-05-20 NOTE — PROGRESS NOTE ADULT - ASSESSMENT
73 yo male with saddle PE, right heart strain, VT, covid +, s/p mechanical thrombectomy, on AC, left UVJ/bladder stone  - pt asymptomatic  - renal fcn nl  - can give flomax to help stone pass completely into bladder  - pt may f/u as OP once treated for covid and current conditions with Dr. Lee

## 2023-05-20 NOTE — PROGRESS NOTE ADULT - ASSESSMENT
72 year old male ([employer] witness) with history of spontaneous superficial venous thrombosis, recent LLE DVT (sub optimally treated) followed by Covid 19 infection transferred from [facility] with acute saddle PE with Rt heart strain. Now s/p mechanical thrombectomy and transitioned from Hep gtt to eliquis.     Acute cor pulmonale   2/2 large saddle PE s/p mechanical thrombectomy with severe Rt heart strain (McConnel +ve)  Suspect sequale of recent LLE DVT which was suboptimally treated (2.5 mg BID x 1 month)  Heparin ggt transitioned to PO eliquis loading dose  Will need hypercoagulable w/u as outpt with primary hematologist     Abd pain Secondary to left renal stone and right sided psoas muscle bleed   Given risks involved with AC and inability to use blood products CT imaging done   Confirms presence of small suspected psoas bleed,   Monitor H/H  Possible repeat CT abdomen tomorrow    LLE DVT   Large clot burden, likely result of above   Clot formed PRIOR to Covid 19 infection   Continue Doac    Hematuria and groin site bleeding   LV function normal  Per IR - No inidication for IVC   Continue Folic acid, MTV, B12 and PO iron/ ascorbic acid for now  Monitor H/H  Consider giving dose of Retacrit   Continue flomax   Urinary straining for stone   Urology recs appreciated     COVID-19  No respiratory distress  Supportive care  Was unable to tolerate Molnupiravir as an outpatient     HTN  Continue Losartan    DVT Prophylaxis : Already has DVT, continue Eliquis as above     Dispo: Possible DC tomorrow if all remains stable.

## 2023-05-20 NOTE — PROGRESS NOTE ADULT - SUBJECTIVE AND OBJECTIVE BOX
HPI:  73 yo Male pt. who is  transferred from [facility] for saddle PE. Pt states that in early april he had some lt. calf pain ( thought pulled muscle ) and went for outpatient US and was found to have a DVT. Pt states that his doctor started him on eliquis 2.5 mg BID. Pt states that 10 days ago he was diagnosed with covid , was tested for viral illness like symptoms and was started on molnupiravir. Pt states that he had a bad reaction to the molnupiravir and had weakness, bad dreams and nausea. Pt states that he was not really able to tolerate po and stopped the eliquis and molnupiravir 5 days ago. Today he went to take trash to the curb when he suddenly felt weak, sob and felt like he as going to pass out. Pt went into the house and still felt poorly so his wife called an ambulance. Pt. is on iv heparin , seen by MICU and cardiology team and plan is to do thrombectomy in am. no cp, sob , no hypoxia at the time of admission. covid 19 positive. (16 May 2023 16:51)    Urology: Limited consult. called for left UVJ/bladder stone noted on CT scan.  Pt transferred form [facility] for b/l saddle PE with right heart strain.  Pt underwent mechanical thrombectomy.  CT scan performed to r/o RP bleed, left UVJ stone noted on scan.  pt is asymptomatic from stone, no pain.  Pt covid +.  pt also on AC, had hematuria that is clearing.    PAST MEDICAL & SURGICAL HISTORY:  SVT (supraventricular tachycardia)      Acute deep vein thrombosis (DVT) of left lower extremity      2019 novel coronavirus disease (COVID-19)      HTN (hypertension)      S/P appendectomy          acetaminophen     Tablet .. 650 milliGRAM(s) Oral every 6 hours PRN  albuterol    90 MICROgram(s) HFA Inhaler 2 Puff(s) Inhalation every 6 hours PRN  apixaban 10 milliGRAM(s) Oral every 12 hours  ascorbic acid 500 milliGRAM(s) Oral daily  cholecalciferol 1000 Unit(s) Oral daily  ferrous    sulfate 325 milliGRAM(s) Oral daily  folic acid 1 milliGRAM(s) Oral daily  guaiFENesin Oral Liquid (Sugar-Free) 100 milliGRAM(s) Oral every 6 hours PRN  losartan 25 milliGRAM(s) Oral daily  multivitamin 1 Tablet(s) Oral daily  ondansetron Injectable 4 milliGRAM(s) IV Push every 6 hours PRN  sodium chloride 0.9%. 500 milliLiter(s) IV Continuous <Continuous>      morphine (Hives)      T(C): 36.8 (05-19-23 @ 20:00), Max: 36.9 (05-19-23 @ 08:24)  HR: 92 (05-19-23 @ 16:00) (60 - 92)  BP: 120/77 (05-19-23 @ 16:00) (120/77 - 139/98)  RR: 18 (05-19-23 @ 16:00) (14 - 18)  SpO2: 94% (05-19-23 @ 16:00) (94% - 96%)      05-18-23 @ 07:01  -  05-19-23 @ 07:00  --------------------------------------------------------  IN: 822 mL / OUT: 820 mL / NET: 2 mL    05-19-23 @ 07:01  -  05-20-23 @ 00:05  --------------------------------------------------------  IN: 0 mL / OUT: 325 mL / NET: -325 mL                              12.0   x     )-----------( x        ( 19 May 2023 21:27 )             35.2       05-19    139  |  104  |  16.6  ----------------------------<  109<H>  4.3   |  25.0  |  0.89    Ca    8.3<L>      19 May 2023 06:40  Mg     2.0     05-19                Radiology:

## 2023-05-20 NOTE — PROGRESS NOTE ADULT - ASSESSMENT
A/P: 73 y/o male with hx of varicose veins and HTN presenting to [facility] with complaints of shortness of breath. He was taking the trash out to the curb today and felt weak, SOB, and felt as if he was going to pass out. He went into the house and still felt weak; his wife called an ambulance. Patient feels no symptoms if he isn't doing anything. He had COVID 10 days ago. He was taking Eliquis 2.5 mg BID for superficial GSV thrombosis and below the knee gastrocnemius DVT. He stopped taking Eliquis 4-5 days ago while he had COVID. Patient is transferred from [facility] for saddle PE. Of note, patient is a [employer]. ECHo ef 60% Severely reduced RV systolic function. + [place]'s sign trop to 0.14 trended down. Now s/p mechanical thrombectomy.

## 2023-05-20 NOTE — PROGRESS NOTE ADULT - SUBJECTIVE AND OBJECTIVE BOX
[facility]                                                         CARDIOLOGY AT [place] [address]                                                         Telephone: [phone]. Fax:[phone]                                                                             PROGRESS NOTE    Reason for follow up: Saddle PE  Update: Patient underwent CT abdomen/pelvis that showed a right psoas hematoma and nephrolithiasis. Patient's H/H continues to downtrend, with hemoglobin 11.1. Patient's hematuria continues to improve at this time.       Review of symptoms:   Cardiac:  No chest pain. No dyspnea. No palpitations.  Respiratory: no cough. No dyspnea  Gastrointestinal: No diarrhea. No abdominal pain. No bleeding.   Neuro: No focal neuro complaints.    Vitals:  T(C): 37 (05-20-23 @ 07:36), Max: 37.3 (05-20-23 @ 00:00)  HR: 85 (05-20-23 @ 08:00) (74 - 92)  BP: 116/80 (05-20-23 @ 08:00) (103/65 - 139/98)  RR: 16 (05-20-23 @ 08:00) (14 - 18)  SpO2: 94% (05-20-23 @ 08:00) (94% - 96%)  Wt(kg): --  I&O's Summary    19 May 2023 07:01  -  20 May 2023 07:00  --------------------------------------------------------  IN: 300 mL / OUT: 970 mL / NET: -670 mL    20 May 2023 07:01  -  20 May 2023 10:58  --------------------------------------------------------  IN: 0 mL / OUT: 300 mL / NET: -300 mL      Weight (kg): 68.3 (05-18 @ 01:22), 77.1 (05-16 @ 08:07)    PHYSICAL EXAM:  Appearance: Comfortable. No acute distress  HEENT:  Atraumatic. Normocephalic.  Normal oral mucosa  Neurologic: A & O x 3, no gross focal deficits.  Cardiovascular: RRR S1 S2, No murmur, no rubs/gallops. No JVD  Respiratory: Lungs clear to auscultation, unlabored   Gastrointestinal:  Soft, Non-tender, + BS  Lower Extremities: 2+ Peripheral Pulses, No clubbing, cyanosis, or edema  Psychiatry: Patient is calm. No agitation.   Skin: warm and dry.    CURRENT CARDIAC MEDICATIONS:  losartan 25 milliGRAM(s) Oral daily      CURRENT OTHER MEDICATIONS:  albuterol    90 MICROgram(s) HFA Inhaler 2 Puff(s) Inhalation every 6 hours PRN Shortness of Breath and/or Wheezing  guaiFENesin Oral Liquid (Sugar-Free) 100 milliGRAM(s) Oral every 6 hours PRN Cough  acetaminophen     Tablet .. 650 milliGRAM(s) Oral every 6 hours PRN Temp greater or equal to 38C (100.4F), Mild Pain (1 - 3), Moderate Pain (4 - 6)  ondansetron Injectable 4 milliGRAM(s) IV Push every 6 hours PRN Nausea and/or Vomiting  apixaban 10 milliGRAM(s) Oral every 12 hours, Stop order after: 14 Doses  ascorbic acid 500 milliGRAM(s) Oral daily  cholecalciferol 1000 Unit(s) Oral daily  ferrous    sulfate 325 milliGRAM(s) Oral daily  folic acid 1 milliGRAM(s) Oral daily  multivitamin 1 Tablet(s) Oral daily  sodium chloride 0.9%. 500 milliLiter(s) (100 mL/Hr) IV Continuous <Continuous>, Stop order after: 5 Hours  tamsulosin 0.4 milliGRAM(s) Oral at bedtime      LABS:	 	  ( 17 May 2023 08:17 )  Troponin T  0.02 ,  CPK  X    , CKMB  X    , BNP X        , ( 16 May 2023 15:18 )  Troponin T  0.14<H>,  CPK  X    , CKMB  X    , BNP X        , ( 16 May 2023 13:35 )  Troponin T  X    ,  CPK  51   , CKMB  X    , BNP X                                  11.1   11.06 )-----------( 167      ( 20 May 2023 06:50 )             32.1     05-20    135  |  103  |  17.8  ----------------------------<  110<H>  3.8   |  23.0  |  0.92    Ca    7.9<L>      20 May 2023 06:50  Mg     2.0     05-19      PT/INR/PTT ( 18 May 2023 21:28 )                       :                       :      X            :       26.3                  .        .                   .              .           .       X           .                                       Lipid Profile:   HgA1c:   TSH:     TELEMETRY: SR, ST  ECG:    DIAGNOSTIC TESTING:  [ ] Echocardiogram:   < from: TTE Echo Complete w/o Contrast w/ Doppler (05.16.23 @ 15:19) >  PHYSICIAN INTERPRETATION:  Left Ventricle: The left ventricular internal cavity size is normal.  Global LV systolic function was normal. Left ventricular ejection   fraction, by visual estimation, is 60 to 65%. Spectral Doppler shows   normal pattern of LV diastolic filling.  Right Ventricle: The right ventricular size is severely enlarged. RV   systolic function is severely reduced. RV apex appears hyperkinetic, with   free wall hypokinesis suggestive of Dougherty's sign.  Left Atrium: Normal left atrial size.  Right Atrium: Mildly enlarged right atrium.  Pericardium: There is no evidence of pericardial effusion.  Mitral Valve: Thickening of the anterior and posterior mitral valve   leaflets. There is mild mitral annular calcification. Trace mitral valve   regurgitation is seen.  Tricuspid Valve: The tricuspid valve is not well seen. Moderate tricuspid   regurgitation is visualized. Estimated pulmonary artery systolic pressure   is 43.3 mmHg assuming a right atrial pressure of 8 mmHg, which is   consistent with mild pulmonary hypertension.  Aortic Valve: The aortic valve was not well visualized. Sclerotic aortic   valve with normal opening. Trivial aortic valve regurgitation is seen.  Pulmonic Valve: The pulmonic valve was not well visualized.  Aorta: Dilated Ao root at 4.0cm.  Pulmonary Artery: The pulmonary artery is not well seen.  Venous: The pulmonary veins were not well visualized. The inferior vena   cava was normal sized, with respiratory size variation less than 50%.  In comparison to the previous echocardiogram(s): There are no prior   studies on this patient for comparison purposes.      Summary:   1. Left ventricular ejection fraction, by visual estimation, is 60 to   65%.   2. Normal global left ventricular systolic function.   3. Severely enlarged right ventricle. Severely reduced RV systolic   function. RV apex appears hyperkinetic, with free wall hypokinesis   suggestive of Dougherty's sign.   4. Mildly enlarged right atrium.   5. Normal left atrial size.   6. Moderate tricuspid regurgitation.   7. Estimated pulmonary artery systolic pressure is 43.3 mmHg assuming a   right atrial pressure of 8 mmHg, which is consistent with mild pulmonary   hypertension.   8. Mild mitral annular calcification.   9. Thickening of the anterior and posterior mitral valve leaflets.  10. Trace mitral valve regurgitation.  11. Sclerotic aortic valve with normal opening.  12. There is no evidence of pericardial effusion.  13. There are no prior studies on this patient for comparison purposes.    Felipa Thomas MD Electronically signed on 5/16/2023 at 4:56:29 PM    < end of copied text >    OTHER: 	    < from: CT Abdomen and Pelvis No Cont (05.19.23 @ 18:37) >  FINDINGS:  LOWER CHEST: Small bilateral pleural effusions and bibasilar atelectasis.    LIVER: Normal.  BILE DUCTS: Nondilated.  GALLBLADDER: Normal.  SPLEEN: Normal.  PANCREAS: Normal.  ADRENALS: Mild bilateral thickening.  KIDNEYS/URETERS: Excreted contrast in the collecting systems. 8 mm left   ureterovesical junction calculus causing mild left hydroureteronephrosis   and stranding.    BLADDER: Diffuse wall thickening.  REPRODUCTIVE ORGANS: Enlarged prostate with intravesicle component of BPH.    BOWEL: Nobowel-related abnormality. Specifically, no bowel obstruction.  PERITONEUM: No free air, ascites, or hemoperitoneum.  VESSELS: Normal caliber aorta.  RETROPERITONEUM/LYMPH NODES: No adenopathy. Mild asymmetric expansion and   hyperdensity of the right psoas muscle suggestive of intramuscular   hemorrhage.  ABDOMINAL WALL: Small fat-containing umbilical hernia.  BONES: No acute bony abnormality.    IMPRESSION:  *  Mild asymmetric expansion and hyperdensity of the right psoas muscle   suggestive ofintramuscular hemorrhage.  *  No retroperitoneal hemorrhage.  *  8 mm left ureterovesical junction calculus causing mild left   hydroureteronephrosis and stranding.    < end of copied text >

## 2023-05-21 LAB
ANION GAP SERPL CALC-SCNC: 11 MMOL/L — SIGNIFICANT CHANGE UP (ref 5–17)
BASOPHILS # BLD AUTO: 0.03 K/UL — SIGNIFICANT CHANGE UP (ref 0–0.2)
BASOPHILS NFR BLD AUTO: 0.4 % — SIGNIFICANT CHANGE UP (ref 0–2)
BUN SERPL-MCNC: 19.1 MG/DL — SIGNIFICANT CHANGE UP (ref 8–20)
CALCIUM SERPL-MCNC: 8.1 MG/DL — LOW (ref 8.4–10.5)
CHLORIDE SERPL-SCNC: 102 MMOL/L — SIGNIFICANT CHANGE UP (ref 96–108)
CO2 SERPL-SCNC: 23 MMOL/L — SIGNIFICANT CHANGE UP (ref 22–29)
CREAT SERPL-MCNC: 0.83 MG/DL — SIGNIFICANT CHANGE UP (ref 0.5–1.3)
EGFR: 92 ML/MIN/1.73M2 — SIGNIFICANT CHANGE UP
EOSINOPHIL # BLD AUTO: 0.12 K/UL — SIGNIFICANT CHANGE UP (ref 0–0.5)
EOSINOPHIL NFR BLD AUTO: 1.6 % — SIGNIFICANT CHANGE UP (ref 0–6)
GLUCOSE SERPL-MCNC: 101 MG/DL — HIGH (ref 70–99)
HCT VFR BLD CALC: 31.9 % — LOW (ref 39–50)
HCT VFR BLD CALC: 38.4 % — LOW (ref 39–50)
HGB BLD-MCNC: 10.6 G/DL — LOW (ref 13–17)
HGB BLD-MCNC: 12.9 G/DL — LOW (ref 13–17)
IMM GRANULOCYTES NFR BLD AUTO: 0.4 % — SIGNIFICANT CHANGE UP (ref 0–0.9)
LYMPHOCYTES # BLD AUTO: 0.88 K/UL — LOW (ref 1–3.3)
LYMPHOCYTES # BLD AUTO: 11.4 % — LOW (ref 13–44)
MCHC RBC-ENTMCNC: 28.9 PG — SIGNIFICANT CHANGE UP (ref 27–34)
MCHC RBC-ENTMCNC: 29.4 PG — SIGNIFICANT CHANGE UP (ref 27–34)
MCHC RBC-ENTMCNC: 33.2 GM/DL — SIGNIFICANT CHANGE UP (ref 32–36)
MCHC RBC-ENTMCNC: 33.6 GM/DL — SIGNIFICANT CHANGE UP (ref 32–36)
MCV RBC AUTO: 86.9 FL — SIGNIFICANT CHANGE UP (ref 80–100)
MCV RBC AUTO: 87.5 FL — SIGNIFICANT CHANGE UP (ref 80–100)
MONOCYTES # BLD AUTO: 0.68 K/UL — SIGNIFICANT CHANGE UP (ref 0–0.9)
MONOCYTES NFR BLD AUTO: 8.8 % — SIGNIFICANT CHANGE UP (ref 2–14)
NEUTROPHILS # BLD AUTO: 5.95 K/UL — SIGNIFICANT CHANGE UP (ref 1.8–7.4)
NEUTROPHILS NFR BLD AUTO: 77.4 % — HIGH (ref 43–77)
PLATELET # BLD AUTO: 188 K/UL — SIGNIFICANT CHANGE UP (ref 150–400)
PLATELET # BLD AUTO: 250 K/UL — SIGNIFICANT CHANGE UP (ref 150–400)
POTASSIUM SERPL-MCNC: 3.7 MMOL/L — SIGNIFICANT CHANGE UP (ref 3.5–5.3)
POTASSIUM SERPL-SCNC: 3.7 MMOL/L — SIGNIFICANT CHANGE UP (ref 3.5–5.3)
RBC # BLD: 3.67 M/UL — LOW (ref 4.2–5.8)
RBC # BLD: 4.39 M/UL — SIGNIFICANT CHANGE UP (ref 4.2–5.8)
RBC # FLD: 13.3 % — SIGNIFICANT CHANGE UP (ref 10.3–14.5)
RBC # FLD: 13.4 % — SIGNIFICANT CHANGE UP (ref 10.3–14.5)
SODIUM SERPL-SCNC: 136 MMOL/L — SIGNIFICANT CHANGE UP (ref 135–145)
WBC # BLD: 10.57 K/UL — HIGH (ref 3.8–10.5)
WBC # BLD: 7.69 K/UL — SIGNIFICANT CHANGE UP (ref 3.8–10.5)
WBC # FLD AUTO: 10.57 K/UL — HIGH (ref 3.8–10.5)
WBC # FLD AUTO: 7.69 K/UL — SIGNIFICANT CHANGE UP (ref 3.8–10.5)

## 2023-05-21 PROCEDURE — 99232 SBSQ HOSP IP/OBS MODERATE 35: CPT

## 2023-05-21 PROCEDURE — 74176 CT ABD & PELVIS W/O CONTRAST: CPT | Mod: 26

## 2023-05-21 RX ORDER — ERYTHROPOIETIN 10000 [IU]/ML
40000 INJECTION, SOLUTION INTRAVENOUS; SUBCUTANEOUS ONCE
Refills: 0 | Status: COMPLETED | OUTPATIENT
Start: 2023-05-21 | End: 2023-05-21

## 2023-05-21 RX ADMIN — LOSARTAN POTASSIUM 25 MILLIGRAM(S): 100 TABLET, FILM COATED ORAL at 05:07

## 2023-05-21 RX ADMIN — Medication 500 MILLIGRAM(S): at 11:08

## 2023-05-21 RX ADMIN — Medication 1000 UNIT(S): at 11:08

## 2023-05-21 RX ADMIN — Medication 325 MILLIGRAM(S): at 11:07

## 2023-05-21 RX ADMIN — APIXABAN 10 MILLIGRAM(S): 2.5 TABLET, FILM COATED ORAL at 05:07

## 2023-05-21 RX ADMIN — ERYTHROPOIETIN 40000 UNIT(S): 10000 INJECTION, SOLUTION INTRAVENOUS; SUBCUTANEOUS at 17:07

## 2023-05-21 RX ADMIN — Medication 1 MILLIGRAM(S): at 11:08

## 2023-05-21 RX ADMIN — APIXABAN 10 MILLIGRAM(S): 2.5 TABLET, FILM COATED ORAL at 17:05

## 2023-05-21 RX ADMIN — Medication 1 TABLET(S): at 11:09

## 2023-05-21 RX ADMIN — TAMSULOSIN HYDROCHLORIDE 0.4 MILLIGRAM(S): 0.4 CAPSULE ORAL at 22:09

## 2023-05-21 NOTE — PROGRESS NOTE ADULT - REASON FOR ADMISSION
Saddle PE

## 2023-05-21 NOTE — PROGRESS NOTE ADULT - NS ATTEND AMEND GEN_ALL_CORE FT
Patient seen and examined at bedside and is hemodynamically stable and saturating well with no complaints of chest pain or shortness of breath but complains of dizziness while getting up from a sitting to standing position     HS trops elevated 442->[id number] at [place] with Trops here 0.14 ->0.02 and   serum probnp: 267  COVID +   TTE: severely dilated severely reduced RV function and size with evidence of McConnells sign with normal LVEF 60-65%   sPESI: 0    Social Hx: Colonoscopy 1-2 yrs ago (Normal); PSA ( follows with urology and elevated PSA 5 had CT scan which was told was normal and to follow up in 6 months)     Intermediate high risk saddle PE with acute cor pulmonale, provoked in the setting of COVID infection and hx of L gastrocnemius DVT   - continue with Hep ggt   - overall clinically hemodynamically stable with no need for supplemental oxygen; + trops with severely reduced severely dilated RV, and based on thrombus burden would ideally be a candidate for mechanical thrombectomy in the AM   - US duplex shows evidence of Acute deep venous thrombosis: above and below the knee. Nonocclusive clot in the left popliteal vein and occlusive clot in the  left peroneal vein.  - discussed with wife that varicosities are a risk factor for thrombosis and that this event was more likely to be provoked in the setting of COVID and being off half dose AC; would still like to check for genetic causes for thrombosis but discussed other factors not check protein C, protein S, antithrombin III and lupus anticoagulant as they are inaccurate during acute VTE and while the patient is on anticoagulation will need eval for Factor V Leiden, Prothrombin gene mutation, MTHFR  beta 2 glycoprotein, anticardiolipin antibody    Felipa Thomas D.O. [facility]  Cardiology/Vascular Cardiology -[facility] Cardiology   Telephone # [phone].
Patient he is doing well with no complaints chest pain or shortness of breath; Hgb 10 today repeat CT abdomen shows no evidence of increased size of the psoas / retroperitoneal hematoma    HS trops elevated 442->917 at [place] with Trops here 0.14 ->0.02 and   serum probnp: 267  COVID +   TTE: severely dilated severely reduced RV function and size with evidence of McConnells sign with normal LVEF 60-65%   sPESI: 0    Social Hx: Colonoscopy 1-2 yrs ago (Normal); PSA ( follows with urology and elevated PSA 5 had CT scan which was told was normal and to follow up in 6 months)     Intermediate high risk saddle PE with acute cor pulmonale, provoked in the setting of COVID infection and hx of L gastrocnemius DVT   - on Eliquis 10mg po q 12hrs for 7 days and then 5mg po q 12hrs for 3-6 months provoked PE in the setting of COVID  - s/p mechanical thrombectomy, will plan for TTE follow up in the outpatient setting to assess for any interval improvement in RV function and size   - Hgb dropped to 10 today;  repeat CT abdomen/pelvis shows no interval increase in the size of hematoma, and no evidence of hydronephrosis and passed the stone  - for anemia receiving Epo   - US duplex shows evidence of Acute deep venous thrombosis: above and below the knee. Nonocclusive clot in the left popliteal vein and occlusive clot in the  left peroneal vein.; no indication for IVC filter and no evidence of phlegmasia or swelling or pain in the lower extremities     - discussed with wife that varicosities are a risk factor for thrombosis and that this event was more likely to be provoked in the setting of COVID and being off half dose AC; would still like to check for genetic causes for thrombosis but discussed other factors not check protein C, protein S, antithrombin III and lupus anticoagulant as they are inaccurate during acute VTE and while the patient is on anticoagulation will need eval for Factor V Leiden, Prothrombin gene mutation, MTHFR  beta 2 glycoprotein, anticardiolipin antibody  - upon discharge patient will need follow up of Hgb and will arrange for follow up with Dr. Linda Thomas D.O. [facility]  Cardiology/Vascular Cardiology -[facility] Cardiology   Telephone # [phone].
Patient seen and examined post INAR and is doing well no chest pain or shortness of breath   Patient notes to have hematoma and bleeding in the R groin post procedure and currenlty have dressing in place an dlying flat   Patient also notes some hematuria (PTT supratherapuetic)     HS trops elevated 442->917 at [place] with Trops here 0.14 ->0.02 and   serum probnp: 267  COVID +   TTE: severely dilated severely reduced RV function and size with evidence of McConnells sign with normal LVEF 60-65%   sPESI: 0    Social Hx: Colonoscopy 1-2 yrs ago (Normal); PSA ( follows with urology and elevated PSA 5 had CT scan which was told was normal and to follow up in 6 months)     Intermediate high risk saddle PE with acute cor pulmonale, provoked in the setting of COVID infection and hx of L gastrocnemius DVT   - continue with Hep ggt due dale bleeding in the groin area   - overall clinically hemodynamically stable with no need for supplemental oxygen; + trops with severely reduced severely dilated RV, and based on thrombus burden would ideally be a candidate for mechanical thrombectomy in the AM   - recommend to repeat Hgb and then conisder checking groin and out opf bed to chair and will make ambulatory tomorrow   - US duplex shows evidence of Acute deep venous thrombosis: above and below the knee. Nonocclusive clot in the left popliteal vein and occlusive clot in the  left peroneal vein.; no indication for IVC filter and no evidence of phlegmasia or swellign or pain in the lower extremities   - discussed with wife that varicosities are a risk factor for thrombosis and that this event was more likely to be provoked in the setting of COVID and being off half dose AC; would still like to check for genetic causes for thrombosis but discussed other factors not check protein C, protein S, antithrombin III and lupus anticoagulant as they are inaccurate during acute VTE and while the patient is on anticoagulation will need eval for Factor V Leiden, Prothrombin gene mutation, MTHFR  beta 2 glycoprotein, anticardiolipin antibody    Plan to transition to DOAC either later tonight or in the AM       Felipa Thomas D.O. [facility]  Cardiology/Vascular Cardiology -[facility] Cardiology   Telephone # [phone].
Patient seen and examined post INAR and is doing well no chest pain or shortness of breath but notes to be having LLQ pain and constipation (discussed with Medicine team will give a enema and then if still symptomatic will do imaging of the abdomen)   There is evidence of hematuria     HS trops elevated 442->[id number] at [place] with Trops here 0.14 ->0.02 and   serum probnp: 267  COVID +   TTE: severely dilated severely reduced RV function and size with evidence of McConnells sign with normal LVEF 60-65%   sPESI: 0    Social Hx: Colonoscopy 1-2 yrs ago (Normal); PSA ( follows with urology and elevated PSA 5 had CT scan which was told was normal and to follow up in 6 months)     Intermediate high risk saddle PE with acute cor pulmonale, provoked in the setting of COVID infection and hx of L gastrocnemius DVT   - on Eliquis 10mg po q 12hrs for 7 days and then 5mg po q 12hrs for 3-6 months provoked PE in the setting of COVID  - s/p mechanical thrombectomy, will plan for TTE follow up in the outpatient setting to assess for any interval improvement in RV function and size   - Hgb has been stable   - US duplex shows evidence of Acute deep venous thrombosis: above and below the knee. Nonocclusive clot in the left popliteal vein and occlusive clot in the  left peroneal vein.; no indication for IVC filter and no evidence of phlegmasia or swelling or pain in the lower extremities   - discussed with wife that varicosities are a risk factor for thrombosis and that this event was more likely to be provoked in the setting of COVID and being off half dose AC; would still like to check for genetic causes for thrombosis but discussed other factors not check protein C, protein S, antithrombin III and lupus anticoagulant as they are inaccurate during acute VTE and while the patient is on anticoagulation will need eval for Factor V Leiden, Prothrombin gene mutation, MTHFR  beta 2 glycoprotein, anticardiolipin antibody    will monitor for another 24hrs due to abdominal pain and still having hematuria       Felipa Thomas D.O. [facility]  Cardiology/Vascular Cardiology -[facility] Cardiology   Telephone # [phone].
Patient seen and examined post INAR and is doing well no chest pain or shortness of breath but notes to be having LLQ pain and constipation (discussed with Medicine team will give a enema and then if still symptomatic will do imaging of the abdomen)   There is evidence of hematuria     HS trops elevated 442->917 at [place] with Trops here 0.14 ->0.02 and   serum probnp: 267  COVID +   TTE: severely dilated severely reduced RV function and size with evidence of McConnells sign with normal LVEF 60-65%   sPESI: 0    Social Hx: Colonoscopy 1-2 yrs ago (Normal); PSA ( follows with urology and elevated PSA 5 had CT scan which was told was normal and to follow up in 6 months)     Intermediate high risk saddle PE with acute cor pulmonale, provoked in the setting of COVID infection and hx of L gastrocnemius DVT   - on Eliquis 10mg po q 12hrs for 7 days and then 5mg po q 12hrs for 3-6 months provoked PE in the setting of COVID  - s/p mechanical thrombectomy, will plan for TTE follow up in the outpatient setting to assess for any interval improvement in RV function and size   - Hgb dropped to 11 today;  Repeat H/H in the AM and repeat CT abdomen/pelvis to make sure there is no increase in the size of hematoma.  - US duplex shows evidence of Acute deep venous thrombosis: above and below the knee. Nonocclusive clot in the left popliteal vein and occlusive clot in the  left peroneal vein.; no indication for IVC filter and no evidence of phlegmasia or swelling or pain in the lower extremities   - discussed with wife that varicosities are a risk factor for thrombosis and that this event was more likely to be provoked in the setting of COVID and being off half dose AC; would still like to check for genetic causes for thrombosis but discussed other factors not check protein C, protein S, antithrombin III and lupus anticoagulant as they are inaccurate during acute VTE and while the patient is on anticoagulation will need eval for Factor V Leiden, Prothrombin gene mutation, MTHFR  beta 2 glycoprotein, anticardiolipin antibody    will monitor for another 24hrs repeat H/H in the AM and repeat CT abdomen/pelvis to make sure there is no increase in the size of hematoma.      Felipa Thomas D.O. [facility]  Cardiology/Vascular Cardiology -[facility] Cardiology   Telephone # [phone].

## 2023-05-21 NOTE — PROGRESS NOTE ADULT - SUBJECTIVE AND OBJECTIVE BOX
Hospitalist Daily Progress Note    Chief Complaint:  Patient is a 72y old  Male who presents with a chief complaint of Saddle PE (21 May 2023 09:50)      SUBJECTIVE / OVERNIGHT EVENTS:  Patient was seen and examined at bedside. No abdominal pain. Feeling well.   Patient denies chest pain, SOB, abd pain, N/V, fever, chills, dysuria or any other complaints. All remainder ROS negative.     MEDICATIONS  (STANDING):  apixaban 10 milliGRAM(s) Oral every 12 hours  ascorbic acid 500 milliGRAM(s) Oral daily  cholecalciferol 1000 Unit(s) Oral daily  epoetin tigist-epbx (RETACRIT) Injectable 52404 Unit(s) SubCutaneous once  ferrous    sulfate 325 milliGRAM(s) Oral daily  folic acid 1 milliGRAM(s) Oral daily  losartan 25 milliGRAM(s) Oral daily  multivitamin 1 Tablet(s) Oral daily  sodium chloride 0.9%. 500 milliLiter(s) (100 mL/Hr) IV Continuous <Continuous>  tamsulosin 0.4 milliGRAM(s) Oral at bedtime    MEDICATIONS  (PRN):  acetaminophen     Tablet .. 650 milliGRAM(s) Oral every 6 hours PRN Temp greater or equal to 38C (100.4F), Mild Pain (1 - 3), Moderate Pain (4 - 6)  albuterol    90 MICROgram(s) HFA Inhaler 2 Puff(s) Inhalation every 6 hours PRN Shortness of Breath and/or Wheezing  guaiFENesin Oral Liquid (Sugar-Free) 100 milliGRAM(s) Oral every 6 hours PRN Cough  ondansetron Injectable 4 milliGRAM(s) IV Push every 6 hours PRN Nausea and/or Vomiting        I&O's Summary    20 May 2023 07:01  -  21 May 2023 07:00  --------------------------------------------------------  IN: 600 mL / OUT: 1300 mL / NET: -700 mL        PHYSICAL EXAM:  Vital Signs Last 24 Hrs  T(C): 36.9 (21 May 2023 12:00), Max: 37.3 (20 May 2023 20:00)  T(F): 98.4 (21 May 2023 12:00), Max: 99.1 (20 May 2023 20:00)  HR: 79 (21 May 2023 08:08) (77 - 90)  BP: 110/75 (21 May 2023 12:00) (99/69 - 119/81)  BP(mean): 82 (21 May 2023 12:00) (76 - 90)  RR: 18 (21 May 2023 12:00) (16 - 26)  SpO2: 95% (21 May 2023 12:00) (93% - 96%)    Parameters below as of 21 May 2023 12:00  Patient On (Oxygen Delivery Method): room air          Constitutional: NAD, Resting  ENT: Supple, No JVD  Lungs: CTA B/L, Non-labored breathing  Cardio: RRR, S1/S2, No murmur  Abdomen: Soft, Nontender, Nondistended; Bowel sounds present  Extremities: No calf tenderness, No pitting edema, right sided groin with hematoma but soft   Musculoskeletal:   No joint swelling  Psych: Calm, cooperative affect appropriate  Neuro: Awake and alert, oriented x 4  Skin: No rashes; no palpable lesions    LABS:                        10.6   7.69  )-----------( 188      ( 21 May 2023 05:22 )             31.9     05-21    136  |  102  |  19.1  ----------------------------<  101<H>  3.7   |  23.0  |  0.83    Ca    8.1<L>      21 May 2023 05:22                CAPILLARY BLOOD GLUCOSE            RADIOLOGY REVIEWED

## 2023-05-21 NOTE — PROGRESS NOTE ADULT - SUBJECTIVE AND OBJECTIVE BOX
[facility] PHYS[facility]                                                         CARDIOLOGY AT [facility]                                                                  [address], [facility] [place] [address]                                                         Telephone: [phone]. Fax:[phone]                                                                             PROGRESS NOTE    Reason for follow up: Saddle PE  Update: H/H 10.6/31.9 today. will repeat CT abd/pelvis for assessment of R psoas hematoma      Review of symptoms:   Cardiac:  No chest pain. No dyspnea. No palpitations.  Respiratory: no cough. No dyspnea  Gastrointestinal: No diarrhea. No abdominal pain. No bleeding.   Neuro: No focal neuro complaints.    Vitals:  T(C): 36.8 (05-21-23 @ 08:08), Max: 37.3 (05-20-23 @ 20:00)  HR: 79 (05-21-23 @ 08:08) (77 - 94)  BP: 119/81 (05-21-23 @ 08:08) (93/68 - 119/81)  RR: 20 (05-21-23 @ 08:08) (16 - 26)  SpO2: 95% (05-21-23 @ 08:08) (93% - 96%)  Wt(kg): --  I&O's Summary    20 May 2023 07:01  -  21 May 2023 07:00  --------------------------------------------------------  IN: 600 mL / OUT: 1300 mL / NET: -700 mL      Weight (kg): 68.3 (05-18 @ 01:22)    PHYSICAL EXAM:  Appearance: Comfortable. No acute distress  HEENT:  Atraumatic. Normocephalic.  Normal oral mucosa  Neurologic: A & O x 3, no gross focal deficits.  Cardiovascular: RRR S1 S2, No murmur, no rubs/gallops. No JVD  Respiratory: Lungs clear to auscultation, unlabored   Gastrointestinal:  Soft, Non-tender, + BS  Lower Extremities: 2+ Peripheral Pulses, No clubbing, cyanosis, or edema  Psychiatry: Patient is calm. No agitation.   Skin: warm and dry.    CURRENT CARDIAC MEDICATIONS:  losartan 25 milliGRAM(s) Oral daily      CURRENT OTHER MEDICATIONS:  albuterol    90 MICROgram(s) HFA Inhaler 2 Puff(s) Inhalation every 6 hours PRN Shortness of Breath and/or Wheezing  guaiFENesin Oral Liquid (Sugar-Free) 100 milliGRAM(s) Oral every 6 hours PRN Cough  acetaminophen     Tablet .. 650 milliGRAM(s) Oral every 6 hours PRN Temp greater or equal to 38C (100.4F), Mild Pain (1 - 3), Moderate Pain (4 - 6)  ondansetron Injectable 4 milliGRAM(s) IV Push every 6 hours PRN Nausea and/or Vomiting  apixaban 10 milliGRAM(s) Oral every 12 hours, Stop order after: 14 Doses  ascorbic acid 500 milliGRAM(s) Oral daily  cholecalciferol 1000 Unit(s) Oral daily  ferrous    sulfate 325 milliGRAM(s) Oral daily  folic acid 1 milliGRAM(s) Oral daily  multivitamin 1 Tablet(s) Oral daily  sodium chloride 0.9%. 500 milliLiter(s) (100 mL/Hr) IV Continuous <Continuous>, Stop order after: 5 Hours  tamsulosin 0.4 milliGRAM(s) Oral at bedtime      LABS:	 	  ( 17 May 2023 08:17 )  Troponin T  0.02 ,  CPK  X    , CKMB  X    , BNP X        , ( 16 May 2023 15:18 )  Troponin T  0.14<H>,  CPK  X    , CKMB  X    , BNP X        , ( 16 May 2023 13:35 )  Troponin T  X    ,  CPK  51   , CKMB  X    , BNP X                                  10.6   7.69  )-----------( 188      ( 21 May 2023 05:22 )             31.9     05-21    136  |  102  |  19.1  ----------------------------<  101<H>  3.7   |  23.0  |  0.83    Ca    8.1<L>      21 May 2023 05:22      PT/INR/PTT ( 18 May 2023 21:28 )                       :                       :      X            :       26.3                  .        .                   .              .           .       X           .                                       Lipid Profile:   HgA1c:   TSH:     TELEMETRY: NSR  ECG:    DIAGNOSTIC TESTING:  [ ] Echocardiogram:   < from: TTE Echo Complete w/o Contrast w/ Doppler (05.16.23 @ 15:19) >  PHYSICIAN INTERPRETATION:  Left Ventricle: The left ventricular internal cavity size is normal.  Global LV systolic function was normal. Left ventricular ejection   fraction, by visual estimation, is 60 to 65%. Spectral Doppler shows   normal pattern of LV diastolic filling.  Right Ventricle: The right ventricular size is severely enlarged. RV   systolic function is severely reduced. RV apex appears hyperkinetic, with   free wall hypokinesis suggestive of Dougherty's sign.  Left Atrium: Normal left atrial size.  Right Atrium: Mildly enlarged right atrium.  Pericardium: There is no evidence of pericardial effusion.  Mitral Valve: Thickening of the anterior and posterior mitral valve   leaflets. There is mild mitral annular calcification. Trace mitral valve   regurgitation is seen.  Tricuspid Valve: The tricuspid valve is not well seen. Moderate tricuspid   regurgitation is visualized. Estimated pulmonary artery systolic pressure   is 43.3 mmHg assuming a right atrial pressure of 8 mmHg, which is   consistent with mild pulmonary hypertension.  Aortic Valve: The aortic valve was not well visualized. Sclerotic aortic   valve with normal opening. Trivial aortic valve regurgitation is seen.  Pulmonic Valve: The pulmonic valve was not well visualized.  Aorta: Dilated Ao root at 4.0cm.  Pulmonary Artery: The pulmonary artery is not well seen.  Venous: The pulmonary veins were not well visualized. The inferior vena   cava was normal sized, with respiratory size variation less than 50%.  In comparison to the previous echocardiogram(s): There are no prior   studies on this patient for comparison purposes.      Summary:   1. Left ventricular ejection fraction, by visual estimation, is 60 to   65%.   2. Normal global left ventricular systolic function.   3. Severely enlarged right ventricle. Severely reduced RV systolic   function. RV apex appears hyperkinetic, with free wall hypokinesis   suggestive of Dougherty's sign.   4. Mildly enlarged right atrium.   5. Normal left atrial size.   6. Moderate tricuspid regurgitation.   7. Estimated pulmonary artery systolic pressure is 43.3 mmHg assuming a   right atrial pressure of 8 mmHg, which is consistent with mild pulmonary   hypertension.   8. Mild mitral annular calcification.   9. Thickening of the anterior and posterior mitral valve leaflets.  10. Trace mitral valve regurgitation.  11. Sclerotic aortic valve with normal opening.  12. There is no evidence of pericardial effusion.  13. There are no prior studies on this patient for comparison purposes.    Felipa Thomas MD Electronically signed on 5/16/2023 at 4:56:29 PM      < end of copied text >  [ ]  Catheterization:  [ ] Stress Test:    OTHER:

## 2023-05-21 NOTE — PROGRESS NOTE ADULT - PROVIDER SPECIALTY LIST ADULT
Internal Medicine
Hospitalist
Intervent Cardiology
Cardiology
Cardiology
Hospitalist
Internal Medicine
Urology
Cardiology
Hospitalist
Cardiology
Cardiology

## 2023-05-21 NOTE — PROGRESS NOTE ADULT - PROBLEM SELECTOR PLAN 3
.  - Continue current regimen.
- Well controlled.   - Continue losartan 25mg PO qday.
- Continue current regimen.
- Continue current regimen.

## 2023-05-21 NOTE — PROGRESS NOTE ADULT - PROBLEM/PLAN-3
Chief Complaint Patient presents with  
[facility] Follow Up  
 COPD  Pneumonia 1. Have you been to the ER, urgent care clinic since your last visit? Hospitalized since your last visit? No 
 
2. Have you seen or consulted any other health care providers outside of the [address] [place] [address] since your last visit? Include any pap smears or colon screening. No 
 
Ms. Emani Fernandez has a reminder for a \"due or due soon\" health maintenance. I have asked that she contact her primary care provider for follow-up on this health maintenance. Patient sats: 88% room air rest  
          93% 2LPM rest 
 
Patient states she has difficulty using symbicort.
DISPLAY PLAN FREE TEXT

## 2023-05-21 NOTE — PROGRESS NOTE ADULT - PROBLEM SELECTOR PLAN 1
- Acute saddle PE.   - Provoked in setting of Covid 19.   - Outpatient Heme/Onc evaluation to evaluate for hypercoagulable.  - S/p mechanical thrombectomy.   - Patient with some right groin bleeding overnight and this morning, resolved a this time. H/H stable.   - Continue Eliquis 10mg PO BID x 7 days, then 5mg PO BID.  - No need for repeat echocardiogram post-procedure.  - Outpatient Urology evaluation for hematuria.
Provoked in the setting of COVID  IV heparin will transition to NOAC  + below knee DVT  Stable for Mechanical thrombectomy today
- Acute saddle PE.   - Provoked in setting of Covid 19.   - Outpatient Heme/Onc evaluation.   - S/p mechanical thrombectomy.   - Patient with some right groin bleeding overnight and this morning, resolved a this time.  - Repeat CBC this afternoon.  - Continue heparin gtt at this time.   - If H/H is stable and no further groin bleeding, will transition to NOAC tomorrow AM.   - No need for repeat echocardiogram post-procedure.
.  - Acute saddle PE, provoked in setting of Covid 19.   - S/p mechanical thrombectomy.   - HR and O2 sat remain stable  - Patient's H/H continues downtrend slightly  with a right psoas muscle hematoma, and hematuria.  - Repeat  CT abdomen/pelvis to make sure there is no increase in the size of hematoma pending  - right groin site slightly ecchymotic but soft, no hematoma palpated, + PP  - Continue Eliquis 10mg PO BID x 7 days, then 5mg PO BID.  - No need for repeat echocardiogram post-procedure.  - Urology following for hematuria.  - Outpatient Heme/Onc evaluation to evaluate for hypercoagulable state.
- Acute saddle PE.   - Provoked in setting of Covid 19.   - Outpatient Heme/Onc evaluation to evaluate for hypercoagulable state.  - S/p mechanical thrombectomy.   - Patient's H/H continues downtrend with a right psoas muscle hematoma, and hematuria.  - Repeat H/H in the AM and repeat CT abdomen/pelvis to make sure there is no increase in the size of hematoma.  - Continue Eliquis 10mg PO BID x 7 days, then 5mg PO BID.  - No need for repeat echocardiogram post-procedure.  - Urology following for hematuria.

## 2023-05-21 NOTE — PROGRESS NOTE ADULT - PROBLEM SELECTOR PLAN 2
- Management per primary team.
.  - Management per primary team.

## 2023-05-21 NOTE — PROGRESS NOTE ADULT - PROBLEM SELECTOR PROBLEM 1
Saddle pulmonary embolus

## 2023-05-21 NOTE — PROGRESS NOTE ADULT - ASSESSMENT
73 y/o male with hx of varicose veins and HTN presenting to [facility] with complaints of shortness of breath. He was taking the trash out to the curb today and felt weak, SOB, and felt as if he was going to pass out. He went into the house and still felt weak; his wife called an ambulance. Patient feels no symptoms if he isn't doing anything. He had COVID 10 days ago. He was taking Eliquis 2.5 mg BID for superficial GSV thrombosis and below the knee gastrocnemius DVT. He stopped taking Eliquis 4-5 days ago while he had COVID. Patient is transferred from [facility] for saddle PE. Of note, patient is a Jainism. ECHo ef 60% Severely reduced RV systolic function. + Hermosillo's sign trop to 0.14 trended down. Now s/p mechanical thrombectomy.

## 2023-05-21 NOTE — PROGRESS NOTE ADULT - ASSESSMENT
72 year old male (Jehovahs witness) with history of spontaneous superficial venous thrombosis, recent LLE DVT (sub optimally treated) followed by Covid 19 infection transferred from [facility] with acute saddle PE with Rt heart strain. Now s/p mechanical thrombectomy and transitioned from Hep gtt to Eliquis.     Acute cor pulmonale   2/2 large saddle PE s/p mechanical thrombectomy with severe Rt heart strain (McConnel +ve)  Suspect sequale of recent LLE DVT which was suboptimally treated (2.5 mg BID x 1 month)  Heparin ggt transitioned to PO eliquis loading dose  Will need hypercoagulable w/u as outpt with primary hematologist     Abd pain Secondary to left renal stone and right sided psoas muscle bleed   Given risks involved with AC and inability to use blood products CT imaging done   Confirms presence of small suspected psoas bleed,   Monitor H/H  Repeat CT abdomen today    Anemia  Likely from right psoas muscle bleed  Will give epo today     LLE DVT   Large clot burden, likely result of above   Clot formed PRIOR to Covid 19 infection   Continue Doac    Hematuria and groin site bleeding   LV function normal  Per IR - No inidication for IVC   Continue Folic acid, MTV, B12 and PO iron/ ascorbic acid for now  Monitor H/H  Continue flomax   Urinary straining for stone   Urology recs appreciated     COVID-19  No respiratory distress  Supportive care  Was unable to tolerate Molnupiravir as an outpatient     HTN  Continue Losartan    DVT Prophylaxis : Already has DVT, continue Eliquis as above     Dispo: Possible DC tomorrow if all remains stable.

## 2023-05-22 ENCOUNTER — TRANSCRIPTION ENCOUNTER (OUTPATIENT)
Age: 73
End: 2023-05-22

## 2023-05-22 VITALS
DIASTOLIC BLOOD PRESSURE: 70 MMHG | OXYGEN SATURATION: 97 % | SYSTOLIC BLOOD PRESSURE: 116 MMHG | RESPIRATION RATE: 18 BRPM | TEMPERATURE: 99 F | HEART RATE: 81 BPM

## 2023-05-22 LAB
ANION GAP SERPL CALC-SCNC: 11 MMOL/L — SIGNIFICANT CHANGE UP (ref 5–17)
BASOPHILS # BLD AUTO: 0.04 K/UL — SIGNIFICANT CHANGE UP (ref 0–0.2)
BASOPHILS NFR BLD AUTO: 0.6 % — SIGNIFICANT CHANGE UP (ref 0–2)
BUN SERPL-MCNC: 20.9 MG/DL — HIGH (ref 8–20)
CALCIUM SERPL-MCNC: 8.3 MG/DL — LOW (ref 8.4–10.5)
CHLORIDE SERPL-SCNC: 103 MMOL/L — SIGNIFICANT CHANGE UP (ref 96–108)
CO2 SERPL-SCNC: 23 MMOL/L — SIGNIFICANT CHANGE UP (ref 22–29)
CREAT SERPL-MCNC: 0.91 MG/DL — SIGNIFICANT CHANGE UP (ref 0.5–1.3)
EGFR: 89 ML/MIN/1.73M2 — SIGNIFICANT CHANGE UP
EOSINOPHIL # BLD AUTO: 0.13 K/UL — SIGNIFICANT CHANGE UP (ref 0–0.5)
EOSINOPHIL NFR BLD AUTO: 1.9 % — SIGNIFICANT CHANGE UP (ref 0–6)
GLUCOSE SERPL-MCNC: 102 MG/DL — HIGH (ref 70–99)
HCT VFR BLD CALC: 32.8 % — LOW (ref 39–50)
HGB BLD-MCNC: 10.9 G/DL — LOW (ref 13–17)
IMM GRANULOCYTES NFR BLD AUTO: 0.6 % — SIGNIFICANT CHANGE UP (ref 0–0.9)
LYMPHOCYTES # BLD AUTO: 0.85 K/UL — LOW (ref 1–3.3)
LYMPHOCYTES # BLD AUTO: 12.1 % — LOW (ref 13–44)
MCHC RBC-ENTMCNC: 29 PG — SIGNIFICANT CHANGE UP (ref 27–34)
MCHC RBC-ENTMCNC: 33.2 GM/DL — SIGNIFICANT CHANGE UP (ref 32–36)
MCV RBC AUTO: 87.2 FL — SIGNIFICANT CHANGE UP (ref 80–100)
MONOCYTES # BLD AUTO: 0.55 K/UL — SIGNIFICANT CHANGE UP (ref 0–0.9)
MONOCYTES NFR BLD AUTO: 7.9 % — SIGNIFICANT CHANGE UP (ref 2–14)
NEUTROPHILS # BLD AUTO: 5.39 K/UL — SIGNIFICANT CHANGE UP (ref 1.8–7.4)
NEUTROPHILS NFR BLD AUTO: 76.9 % — SIGNIFICANT CHANGE UP (ref 43–77)
PLATELET # BLD AUTO: 218 K/UL — SIGNIFICANT CHANGE UP (ref 150–400)
POTASSIUM SERPL-MCNC: 3.8 MMOL/L — SIGNIFICANT CHANGE UP (ref 3.5–5.3)
POTASSIUM SERPL-SCNC: 3.8 MMOL/L — SIGNIFICANT CHANGE UP (ref 3.5–5.3)
RBC # BLD: 3.76 M/UL — LOW (ref 4.2–5.8)
RBC # FLD: 13.5 % — SIGNIFICANT CHANGE UP (ref 10.3–14.5)
SODIUM SERPL-SCNC: 137 MMOL/L — SIGNIFICANT CHANGE UP (ref 135–145)
WBC # BLD: 7 K/UL — SIGNIFICANT CHANGE UP (ref 3.8–10.5)
WBC # FLD AUTO: 7 K/UL — SIGNIFICANT CHANGE UP (ref 3.8–10.5)

## 2023-05-22 PROCEDURE — C1757: CPT

## 2023-05-22 PROCEDURE — 99239 HOSP IP/OBS DSCHRG MGMT >30: CPT

## 2023-05-22 PROCEDURE — 86803 HEPATITIS C AB TEST: CPT

## 2023-05-22 PROCEDURE — 85018 HEMOGLOBIN: CPT

## 2023-05-22 PROCEDURE — 83735 ASSAY OF MAGNESIUM: CPT

## 2023-05-22 PROCEDURE — 85730 THROMBOPLASTIN TIME PARTIAL: CPT

## 2023-05-22 PROCEDURE — C1887: CPT

## 2023-05-22 PROCEDURE — 93970 EXTREMITY STUDY: CPT

## 2023-05-22 PROCEDURE — 83880 ASSAY OF NATRIURETIC PEPTIDE: CPT

## 2023-05-22 PROCEDURE — 93306 TTE W/DOPPLER COMPLETE: CPT

## 2023-05-22 PROCEDURE — 74176 CT ABD & PELVIS W/O CONTRAST: CPT

## 2023-05-22 PROCEDURE — 80048 BASIC METABOLIC PNL TOTAL CA: CPT

## 2023-05-22 PROCEDURE — 85025 COMPLETE CBC W/AUTO DIFF WBC: CPT

## 2023-05-22 PROCEDURE — 93005 ELECTROCARDIOGRAM TRACING: CPT

## 2023-05-22 PROCEDURE — 82962 GLUCOSE BLOOD TEST: CPT

## 2023-05-22 PROCEDURE — 80053 COMPREHEN METABOLIC PANEL: CPT

## 2023-05-22 PROCEDURE — 99285 EMERGENCY DEPT VISIT HI MDM: CPT | Mod: 25

## 2023-05-22 PROCEDURE — 36415 COLL VENOUS BLD VENIPUNCTURE: CPT

## 2023-05-22 PROCEDURE — C1889: CPT

## 2023-05-22 PROCEDURE — 85014 HEMATOCRIT: CPT

## 2023-05-22 PROCEDURE — 83605 ASSAY OF LACTIC ACID: CPT

## 2023-05-22 PROCEDURE — 96365 THER/PROPH/DIAG IV INF INIT: CPT

## 2023-05-22 PROCEDURE — 85027 COMPLETE CBC AUTOMATED: CPT

## 2023-05-22 PROCEDURE — 84484 ASSAY OF TROPONIN QUANT: CPT

## 2023-05-22 PROCEDURE — C1894: CPT

## 2023-05-22 PROCEDURE — C1769: CPT

## 2023-05-22 RX ORDER — CHOLECALCIFEROL (VITAMIN D3) 125 MCG
1000 CAPSULE ORAL
Qty: 0 | Refills: 0 | DISCHARGE
Start: 2023-05-22

## 2023-05-22 RX ORDER — TAMSULOSIN HYDROCHLORIDE 0.4 MG/1
1 CAPSULE ORAL
Qty: 30 | Refills: 0
Start: 2023-05-22 | End: 2023-06-20

## 2023-05-22 RX ORDER — ASCORBIC ACID 60 MG
1 TABLET,CHEWABLE ORAL
Qty: 0 | Refills: 0 | DISCHARGE
Start: 2023-05-22

## 2023-05-22 RX ORDER — APIXABAN 2.5 MG/1
2 TABLET, FILM COATED ORAL
Qty: 60 | Refills: 0
Start: 2023-05-22

## 2023-05-22 RX ORDER — FERROUS SULFATE 325(65) MG
1 TABLET ORAL
Qty: 30 | Refills: 0
Start: 2023-05-22 | End: 2023-06-20

## 2023-05-22 RX ORDER — FOLIC ACID 0.8 MG
1 TABLET ORAL
Qty: 0 | Refills: 0 | DISCHARGE
Start: 2023-05-22

## 2023-05-22 RX ADMIN — Medication 500 MILLIGRAM(S): at 12:23

## 2023-05-22 RX ADMIN — APIXABAN 10 MILLIGRAM(S): 2.5 TABLET, FILM COATED ORAL at 05:16

## 2023-05-22 RX ADMIN — Medication 1 MILLIGRAM(S): at 12:24

## 2023-05-22 RX ADMIN — LOSARTAN POTASSIUM 25 MILLIGRAM(S): 100 TABLET, FILM COATED ORAL at 05:16

## 2023-05-22 RX ADMIN — Medication 325 MILLIGRAM(S): at 12:24

## 2023-05-22 RX ADMIN — Medication 1000 UNIT(S): at 12:23

## 2023-05-22 RX ADMIN — Medication 1 TABLET(S): at 12:23

## 2023-05-22 NOTE — DISCHARGE NOTE PROVIDER - PROVIDER TOKENS
FREE:[LAST:[Primary doctor],PHONE:[(   )    -],FAX:[(   )    -]],PROVIDER:[TOKEN:[[id number]]],PROVIDER:[TOKEN:[[id number]]],PROVIDER:[TOKEN:[[id number]]]

## 2023-05-22 NOTE — DISCHARGE NOTE PROVIDER - NSDCCPCAREPLAN_GEN_ALL_CORE_FT
PRINCIPAL DISCHARGE DIAGNOSIS  Diagnosis: Saddle embolus of pulmonary artery  Assessment and Plan of Treatment: Continue current medications as prescribed.  Follow up with primary doctor, cardiology and hematology.      SECONDARY DISCHARGE DIAGNOSES  Diagnosis: Hematoma of right psoas region to anticoagulant therapy, sequela  Assessment and Plan of Treatment: Continue current medications as prescribed.  Follow up with primary doctor and cardiology.    Diagnosis: Anemia  Assessment and Plan of Treatment: Continue current medications as prescribed.  Follow up with primary doctor and hematology.    Diagnosis: Renal stone  Assessment and Plan of Treatment: Continue current medications as prescribed.  Follow up with primary doctor and urology.    Diagnosis: HTN (hypertension)  Assessment and Plan of Treatment: Continue current medications as prescribed.  Follow up with primary doctor.    Diagnosis: 2019 novel coronavirus disease (COVID-19)  Assessment and Plan of Treatment: Supportive care.     PRINCIPAL DISCHARGE DIAGNOSIS  Diagnosis: Saddle embolus of pulmonary artery  Assessment and Plan of Treatment: Continue current medications as prescribed.  Follow up with primary doctor, cardiology and hematology.      SECONDARY DISCHARGE DIAGNOSES  Diagnosis: 2019 novel coronavirus disease (COVID-19)  Assessment and Plan of Treatment: Supportive care.    Diagnosis: HTN (hypertension)  Assessment and Plan of Treatment: Continue current medications as prescribed.  Follow up with primary doctor.    Diagnosis: Renal stone  Assessment and Plan of Treatment: Continue current medications as prescribed.  Follow up with primary doctor and urology.    Diagnosis: Anemia  Assessment and Plan of Treatment: Continue current medications as prescribed.  Follow up with primary doctor and hematology.    Diagnosis: Hematoma of right psoas region to anticoagulant therapy, sequela  Assessment and Plan of Treatment: Continue current medications as prescribed.  Follow up with primary doctor and cardiology.

## 2023-05-22 NOTE — DISCHARGE NOTE PROVIDER - HOSPITAL COURSE
72 year old male (Hindu) with history of spontaneous superficial venous thrombosis, recent LLE DVT (sub optimally treated) followed by Covid 19 infection transferred from [facility] with acute saddle PE with Rt heart strain. Patient s/p mechanical thrombectomy and transitioned from Hep gtt to Eliquis. Patient will need hypercoagulable work up as outpatient with primary hematologist.  CT Abd/pelvis performed for abdominal pain and hematuria, showed left renal stone and right small psoas intramuscular hematoma.  IR evaluated for IVC, and no indication for IVC.  Urology consulted for renal stone, recommended Flomax, straining urine and outpatient follow up with Dr. Lee.  Repeat CT performed and unchanged. Hgb remained stable.  Hematuria resolved.  Patient stable for discharge to home with outpatient follow up with primary doctor, hematology, cardiology and urology.    Vital Signs Last 24 Hrs  T(C): 37.1 (22 May 2023 08:00), Max: 37.5 (21 May 2023 20:30)  T(F): 98.8 (22 May 2023 08:00), Max: 99.5 (21 May 2023 20:30)  HR: 81 (22 May 2023 08:00) (79 - 97)  BP: 116/70 (22 May 2023 08:00) (106/72 - 127/83)  BP(mean): 87 (21 May 2023 20:30) (82 - 87)  RR: 18 (22 May 2023 08:00) (18 - 20)  SpO2: 97% (22 May 2023 08:00) (92% - 97%)    Parameters below as of 22 May 2023 08:00  Patient On (Oxygen Delivery Method): room air      PHYSICAL EXAM:  GENERAL: NAD  HEAD:  Atraumatic, Normocephalic  NECK: Supple, No JVD, Normal thyroid  NERVOUS SYSTEM:  Alert & Oriented X3, Good concentration; Motor Strength 5/5 B/L upper and lower extremities  CHEST/LUNG: Clear to auscultation bilaterally  HEART: Regular rate and rhythm; No murmurs, rubs, or gallops  ABDOMEN: Soft, Nontender, Nondistended; Bowel sounds present  EXTREMITIES:  2+ Peripheral Pulses, Right groin with ecchymosis, no bleeding noted            72 year old male (Hindu) with history of spontaneous superficial venous thrombosis, recent LLE DVT (sub optimally treated) followed by Covid 19 infection transferred from [facility] with acute saddle PE with Rt heart strain. Patient s/p mechanical thrombectomy and transitioned from Hep gtt to Eliquis. Patient will need hypercoagulable work up as outpatient with primary hematologist.  CT Abd/pelvis performed for abdominal pain and hematuria, showed left renal stone and right small psoas intramuscular hematoma.  IR evaluated for IVC, and no indication for IVC.  Urology consulted for renal stone, recommended Flomax, straining urine and outpatient follow up with Dr. Lee.  Repeat CT performed and unchanged. Hgb remained stable.  Hematuria resolved.  Patient stable for discharge to home with outpatient follow up with primary doctor, hematology, cardiology and urology.

## 2023-05-22 NOTE — DISCHARGE NOTE PROVIDER - POSTFACE STATEMENT FOR MINUTES SPENT
Addended by: SHAYNE FELICIANO on: 6/3/2021 12:18 PM     Modules accepted: Orders     minutes on the discharge service.

## 2023-05-22 NOTE — DISCHARGE NOTE NURSING/CASE MANAGEMENT/SOCIAL WORK - PATIENT PORTAL LINK FT
You can access the FollowMyHealth Patient Portal offered by [facility] by registering at the following website: [email or web address]. By joining [employer]’s FollowMyHealth portal, you will also be able to view your health information using other applications (apps) compatible with our system.

## 2023-05-22 NOTE — DISCHARGE NOTE PROVIDER - ATTENDING DISCHARGE PHYSICAL EXAMINATION:
PHYSICAL EXAM:  Vital Signs Last 24 Hrs  T(F): 98.8 (22 May 2023 08:00), Max: 99.5 (21 May 2023 20:30)  HR: 81 (22 May 2023 08:00) (79 - 97)  BP: 116/70 (22 May 2023 08:00) (106/72 - 127/83)  RR: 18 (22 May 2023 08:00) (18 - 20)  SpO2: 97% (22 May 2023 08:00) (92% - 97%)    GENERAL: NAD, Resting in bed  Eyes: EOMI, PERRLA  ENMT: Conjunctiva and sclera clear; supple neck, No JVD  Cardiovascular: S1,S2, RRR, No murmur  Respiratory: CTA B/L, Non-labored breathing  GI: Bowel sounds present; Soft, Nontender, Nondistended. No hepatomegaly  Genitourinary: Deferred  Skin:  no breakdowns, ulcers or discharge, No rashes or lesions  Neurology: Alert & Oriented X3, non-focal and spontaneous movements of all extremities, CN 2 to 12 grossly intact   Psych: Appropriate mood and affect, calm, pleasant, Responds appropriately to questions

## 2023-05-22 NOTE — DISCHARGE NOTE PROVIDER - CARE PROVIDER_API CALL
Primary doctor,   Phone: (   )    -  Fax: (   )    -  Follow Up Time:     Gael Mckeon (DO)  Cardiovascular Disease; Nuclear Cardiology  [address] [place] [address]  Phone: [phone]  Fax: [phone]  Follow Up Time:     Hamlet Lee)  Urology  [address], [address]  [place] [address]  Phone: [phone]  Fax: [phone]  Follow Up Time:     Karina Elias)  Hematology; HospicePalliative Medicine; Medical Oncology  [address] [place] [address]  Phone: [phone]  Fax: [phone]  Follow Up Time:   
68

## 2023-05-22 NOTE — DISCHARGE NOTE PROVIDER - NSDCMRMEDTOKEN_GEN_ALL_CORE_FT
ascorbic acid 500 mg oral tablet: 1 tab(s) orally once a day  cholecalciferol oral tablet: 1000 unit(s) orally once a day  Eliquis 5 mg oral tablet: 2 tab(s) orally every 12 hours [id number] doses and then 1tab PO 2xdaily  ferrous sulfate 325 mg (65 mg elemental iron) oral tablet: 1 tab(s) orally once a day  folic acid 1 mg oral tablet: 1 tab(s) orally once a day  losartan 25 mg oral tablet: 1 tab(s) orally once a day  Multiple Vitamins oral tablet: 1 tab(s) orally once a day  tamsulosin 0.4 mg oral capsule: 1 cap(s) orally once a day (at bedtime)

## 2023-05-22 NOTE — DISCHARGE NOTE PROVIDER - CARE PROVIDERS DIRECT ADDRESSES
,DirectAddress_Unknown,[email or web address]

## 2023-05-23 PROBLEM — U07.1 COVID-19: Chronic | Status: ACTIVE | Noted: 2023-05-16

## 2023-05-23 PROBLEM — I10 ESSENTIAL (PRIMARY) HYPERTENSION: Chronic | Status: ACTIVE | Noted: 2023-05-16

## 2023-05-23 PROBLEM — I47.1 SUPRAVENTRICULAR TACHYCARDIA: Chronic | Status: ACTIVE | Noted: 2023-05-16

## 2023-05-23 PROBLEM — I82.402 ACUTE EMBOLISM AND THROMBOSIS OF UNSPECIFIED DEEP VEINS OF LEFT LOWER EXTREMITY: Chronic | Status: ACTIVE | Noted: 2023-05-16

## 2023-06-06 ENCOUNTER — APPOINTMENT (OUTPATIENT)
Dept: CARDIOLOGY | Facility: CLINIC | Age: 73
End: 2023-06-06
Payer: MEDICARE

## 2023-06-06 VITALS
HEIGHT: 71 IN | BODY MASS INDEX: 22.12 KG/M2 | OXYGEN SATURATION: 98 % | WEIGHT: 158 LBS | DIASTOLIC BLOOD PRESSURE: 89 MMHG | HEART RATE: 77 BPM | SYSTOLIC BLOOD PRESSURE: 131 MMHG

## 2023-06-06 DIAGNOSIS — I87.2 VENOUS INSUFFICIENCY (CHRONIC) (PERIPHERAL): ICD-10-CM

## 2023-06-06 PROCEDURE — 99215 OFFICE O/P EST HI 40 MIN: CPT

## 2023-06-06 RX ORDER — FERROUS SULFATE, DRIED 160(50) MG
160 (50 FE) TABLET, EXTENDED RELEASE ORAL TWICE DAILY
Qty: 60 | Refills: 3 | Status: ACTIVE | COMMUNITY
Start: 2023-06-06 | End: 1900-01-01

## 2023-06-06 RX ORDER — APIXABAN 2.5 MG/1
2.5 TABLET, FILM COATED ORAL
Qty: 60 | Refills: 2 | Status: DISCONTINUED | COMMUNITY
Start: 2023-03-31 | End: 2023-06-06

## 2023-06-06 NOTE — PHYSICAL EXAM
[General Appearance - Well Developed] : well developed [Normal Appearance] : normal appearance [Well Groomed] : well groomed [General Appearance - Well Nourished] : well nourished [No Deformities] : no deformities [General Appearance - In No Acute Distress] : no acute distress [Normal Conjunctiva] : the conjunctiva exhibited no abnormalities [Eyelids - No Xanthelasma] : the eyelids demonstrated no xanthelasmas [Normal Oral Mucosa] : normal oral mucosa [No Oral Pallor] : no oral pallor [No Oral Cyanosis] : no oral cyanosis [Normal Jugular Venous A Waves Present] : normal jugular venous A waves present [Normal Jugular Venous V Waves Present] : normal jugular venous V waves present [No Jugular Venous Wiseman A Waves] : no jugular venous wiseman A waves [Respiration, Rhythm And Depth] : normal respiratory rhythm and effort [Exaggerated Use Of Accessory Muscles For Inspiration] : no accessory muscle use [Auscultation Breath Sounds / Voice Sounds] : lungs were clear to auscultation bilaterally [Heart Rate And Rhythm] : heart rate and rhythm were normal [Heart Sounds] : normal S1 and S2 [Murmurs] : no murmurs present [Abdomen Soft] : soft [Abdomen Tenderness] : non-tender [] : no hepato-splenomegaly [Abdomen Mass (___ Cm)] : no abdominal mass palpated [Abnormal Walk] : normal gait [Gait - Sufficient For Exercise Testing] : the gait was sufficient for exercise testing [Nail Clubbing] : no clubbing of the fingernails [Cyanosis Of Fingers] : no cyanosis of the fingers [Toes Cyanosis] : no cyanosis of the toes [Raynaud's Phenomenon] : the fingers do not pallavi with cold challenge [Petechiae Upper Ext] : no petechiae on the upper extremities [Petechiae Lower Ext] : no petechiae on the lower extremities [Splinter Hemorrhages] : no splinter hemorrhages of the nail were seen [Osler's Nodes] : Osler's nodes were not seen [Ischemia Upper Ext] : there were no ischemic changes of the upper extremities [FreeTextEntry1] : Scattered varicose veins.  There is hyperpigmentation of the medial malleolar areas.   The right GSV mid to distal has a small palp cord, without symtoms [Abnormal Color] : abnormal color and pigmentation

## 2023-06-06 NOTE — ASSESSMENT
[FreeTextEntry1] : Assessment:\par 1.  Chronic Venous insufficiency \par s/p GSV ligation on the left\par wearing compression\par symptomatic\par 2.  Prior episodes of phlebitis\par 3.  Family history of VV\par \par Plan\par 1.  Send thrombophilia workup\par 2.  continue eliquis 5mg BID for now\par 3.  watch for signs and symptoms of bleeding\par 4.  He has had recurrent events now.  Likely will need some form of a/c long term but need to weigh risk vs benefit given Sabianist preference.\par 5.  Venous duplex and echo\par 6.  Return in 8-10 weeks\par 7.   Spyropolous eval to help guide long term a/c strategy

## 2023-06-06 NOTE — REASON FOR VISIT
[Follow-Up - Clinic] : a clinic follow-up of [FreeTextEntry2] : Varicose Veins [FreeTextEntry1] : 6/6/2023\par Was on half dose apixa for distal DVT / GSV thrombus (this dose was chosen because of Jehobva;s witness status)\par Ran out, had covid.  \par Felt sick.  Went to ER, Saddle PE.  Had thrombectomy .  Discharged late May\par here with his family \par on eliquis and iron\par feeling better\par BP and HR are ok\par \par No bleeding issues\par \par \par 3/28/2023\par Went for a long walk last week\par now is having LEFT sided calf pains and tenderness\par he is off a/c\par  here with his wife\par \par otherwise no sob, no CP , no palps\par \par 6/7/2022\par Here for followup visit\par Completed 45 days of half dose xarleto 10mg daily\par stopped in a few days ago\par on exam he has a non-tender palpable mid to distal GSV cord\par Plan for venous duplex today to re-assess shows CHRONIC R GSV thrombus\par \par NO CP or SOB\par Seeing urology tomorrow for elevated PSA\par \par 4/12/2022\par Here for followup visit.  \par Doing well\par Here with his wife\par Reitred 1 year ago. \par He has a palpable cord of the R GSV, large segment\par Leg was itchy, red and painful\par It has improved since then, but there is still a cord.  \par He reports that his PSA is elevated.  Seen by urologist in November/December.  \par \par \par Medications:\par Losartan 25mg daily \par \par No other meds. \par \par 2018:\par 67 year old male here for evaluation of varicose veins.  He has had VV for many years.  3 years ago he underwent a left GSV ligation at [facility] with Dr. Bartlett.  He states that he has darkening of the skin of the R medial malleolar area.  He has "flares" sometimes which are relieved with heat, walking, compression and topical cayenne pepper.  He works - stands all day.  He has had several episodes of superficial phelbitis, but not currently.  He is here with his wife.  No prior DVt.  No history of DVT.  His las venous duplex was several years ago.  \par \par Family history\par Mother with Varicose Veins\par \par \par Meds:\par Natto supplement\par

## 2023-06-07 LAB
ALBUMIN SERPL ELPH-MCNC: 4.1 G/DL
ALP BLD-CCNC: 112 U/L
ALT SERPL-CCNC: 23 U/L
ANION GAP SERPL CALC-SCNC: 13 MMOL/L
AST SERPL-CCNC: 21 U/L
BILIRUB SERPL-MCNC: 0.5 MG/DL
BUN SERPL-MCNC: 19 MG/DL
CALCIUM SERPL-MCNC: 9.4 MG/DL
CHLORIDE SERPL-SCNC: 105 MMOL/L
CO2 SERPL-SCNC: 23 MMOL/L
CREAT SERPL-MCNC: 0.96 MG/DL
DEPRECATED D DIMER PPP IA-ACNC: 287 NG/ML DDU
EGFR: 83 ML/MIN/1.73M2
GLUCOSE SERPL-MCNC: 102 MG/DL
HCYS SERPL-MCNC: 12.5 UMOL/L
POTASSIUM SERPL-SCNC: 4.1 MMOL/L
PROT SERPL-MCNC: 6.7 G/DL
SODIUM SERPL-SCNC: 141 MMOL/L

## 2023-06-08 LAB
APO LP(A) SERPL-MCNC: 17.9 NMOL/L
B2 GLYCOPROT1 IGA SERPL IA-ACNC: <5 SAU
B2 GLYCOPROT1 IGG SER-ACNC: <5 SGU
B2 GLYCOPROT1 IGM SER-ACNC: <5 SMU
CARDIOLIPIN IGM SER-MCNC: 7 MPL
CARDIOLIPIN IGM SER-MCNC: <5 GPL
SILICA CLOTTING TIME INTERPRETATION: NORMAL
SILICA CLOTTING TIME S/C: 0.97 RATIO

## 2023-06-12 LAB — CARDIOLIPIN AB SER IA-ACNC: NEGATIVE

## 2023-06-13 LAB
HLX MTHFR FINAL REPORT: NORMAL
TPA AG PPP IA-MCNC: 7 IU/ML

## 2023-06-14 LAB
DNA PLOIDY SPEC FC-IMP: NORMAL
PTR INTERP: NORMAL

## 2023-07-07 ENCOUNTER — APPOINTMENT (OUTPATIENT)
Dept: ULTRASOUND IMAGING | Facility: CLINIC | Age: 73
End: 2023-07-07
Payer: MEDICARE

## 2023-07-07 ENCOUNTER — OUTPATIENT (OUTPATIENT)
Dept: OUTPATIENT SERVICES | Facility: HOSPITAL | Age: 73
LOS: 1 days | End: 2023-07-07
Payer: MEDICARE

## 2023-07-07 DIAGNOSIS — I82.409 ACUTE EMBOLISM AND THROMBOSIS OF UNSPECIFIED DEEP VEINS OF UNSPECIFIED LOWER EXTREMITY: ICD-10-CM

## 2023-07-07 DIAGNOSIS — Z90.49 ACQUIRED ABSENCE OF OTHER SPECIFIED PARTS OF DIGESTIVE TRACT: Chronic | ICD-10-CM

## 2023-07-07 PROCEDURE — 93970 EXTREMITY STUDY: CPT | Mod: 26

## 2023-07-07 PROCEDURE — 93970 EXTREMITY STUDY: CPT

## 2023-07-18 ENCOUNTER — APPOINTMENT (OUTPATIENT)
Dept: CARDIOLOGY | Facility: CLINIC | Age: 73
End: 2023-07-18
Payer: MEDICARE

## 2023-07-18 VITALS
WEIGHT: 158 LBS | HEIGHT: 71 IN | DIASTOLIC BLOOD PRESSURE: 100 MMHG | SYSTOLIC BLOOD PRESSURE: 160 MMHG | HEART RATE: 73 BPM | OXYGEN SATURATION: 96 % | BODY MASS INDEX: 22.12 KG/M2

## 2023-07-18 VITALS — DIASTOLIC BLOOD PRESSURE: 84 MMHG | SYSTOLIC BLOOD PRESSURE: 136 MMHG

## 2023-07-18 PROCEDURE — 99214 OFFICE O/P EST MOD 30 MIN: CPT

## 2023-07-18 NOTE — PHYSICAL EXAM
[General Appearance - Well Developed] : well developed [Normal Appearance] : normal appearance [Well Groomed] : well groomed [General Appearance - Well Nourished] : well nourished [No Deformities] : no deformities [General Appearance - In No Acute Distress] : no acute distress [Normal Conjunctiva] : the conjunctiva exhibited no abnormalities [Eyelids - No Xanthelasma] : the eyelids demonstrated no xanthelasmas [Normal Oral Mucosa] : normal oral mucosa [No Oral Pallor] : no oral pallor [No Oral Cyanosis] : no oral cyanosis [Normal Jugular Venous A Waves Present] : normal jugular venous A waves present [Normal Jugular Venous V Waves Present] : normal jugular venous V waves present [No Jugular Venous Wiseman A Waves] : no jugular venous wiseman A waves [Respiration, Rhythm And Depth] : normal respiratory rhythm and effort [Exaggerated Use Of Accessory Muscles For Inspiration] : no accessory muscle use [Auscultation Breath Sounds / Voice Sounds] : lungs were clear to auscultation bilaterally [Heart Rate And Rhythm] : heart rate and rhythm were normal [Heart Sounds] : normal S1 and S2 [Murmurs] : no murmurs present [Abdomen Soft] : soft [Abdomen Tenderness] : non-tender [Abdomen Mass (___ Cm)] : no abdominal mass palpated [Abnormal Walk] : normal gait [Gait - Sufficient For Exercise Testing] : the gait was sufficient for exercise testing [Abnormal Color] : abnormal color and pigmentation [Nail Clubbing] : no clubbing of the fingernails [Cyanosis, Localized] : no localized cyanosis [Petechial Hemorrhages (___cm)] : no petechial hemorrhages [Skin Color & Pigmentation] : normal skin color and pigmentation [] : no rash [No Venous Stasis] : no venous stasis [Skin Lesions] : no skin lesions [No Skin Ulcers] : no skin ulcer [No Xanthoma] : no  xanthoma was observed [Nail Clubbing] : no clubbing of the fingernails [Cyanosis Of Fingers] : no cyanosis of the fingers [Toes Cyanosis] : no cyanosis of the toes [Raynaud's Phenomenon] : the fingers do not pallavi with cold challenge [Petechiae Upper Ext] : no petechiae on the upper extremities [Petechiae Lower Ext] : no petechiae on the lower extremities [Splinter Hemorrhages] : no splinter hemorrhages of the nail were seen [Osler's Nodes] : Osler's nodes were not seen [Ischemia Upper Ext] : there were no ischemic changes of the upper extremities [FreeTextEntry1] : Scattered varicose veins.  There is hyperpigmentation of the medial malleolar areas.   The right GSV mid to distal has a small palp cord, without symtoms

## 2023-07-18 NOTE — REASON FOR VISIT
[Follow-Up - Clinic] : a clinic follow-up of [FreeTextEntry2] : Varicose Veins [FreeTextEntry1] : 7/18/2023\par \par Eliquis 5mg BID continues\par No bleeding issues\par Went to Dr. Milner - reports normal echo\par Venous duplex:  chronic superficial thromosis R GSV and L LE.  \par LDL was 181\par Hemoglobin stable on labwork from July 11th. \par \par \par 6/6/2023\par Was on half dose apixa for distal DVT / GSV thrombus (this dose was chosen because of Jehobva;s witness status)\par Ran out, had covid.  \par Felt sick.  Went to ER, [place] PE.  Had thrombectomy .  Discharged late May\par here with his family \par on eliquis and iron\par feeling better\par BP and HR are ok\par \par No bleeding issues\par \par \par 3/28/2023\par Went for a long walk last week\par now is having LEFT sided calf pains and tenderness\par he is off a/c\par  here with his wife\par \par otherwise no sob, no CP , no palps\par \par 6/7/2022\par Here for followup visit\par Completed 45 days of half dose xarleto 10mg daily\par stopped in a few days ago\par on exam he has a non-tender palpable mid to distal GSV cord\par Plan for venous duplex today to re-assess shows CHRONIC R GSV thrombus\par \par NO CP or SOB\par Seeing urology tomorrow for elevated PSA\par \par 4/12/2022\par Here for followup visit.  \par Doing well\par Here with his wife\par Reitred 1 year ago. \par He has a palpable cord of the R GSV, large segment\par Leg was itchy, red and painful\par It has improved since then, but there is still a cord.  \par He reports that his PSA is elevated.  Seen by urologist in November/December.  \par \par \par Medications:\par Losartan 25mg daily \par \par No other meds. \par \par 2018:\par 67 year old male here for evaluation of varicose veins.  He has had VV for many years.  3 years ago he underwent a left GSV ligation at [facility] with Dr. Bartlett.  He states that he has darkening of the skin of the R medial malleolar area.  He has "flares" sometimes which are relieved with heat, walking, compression and topical cayenne pepper.  He works - stands all day.  He has had several episodes of superficial phelbitis, but not currently.  He is here with his wife.  No prior DVt.  No history of DVT.  His las venous duplex was several years ago.  \par \par Family history\par Mother with Varicose Veins\par \par \par Meds:\par Natto supplement\par

## 2023-07-18 NOTE — ASSESSMENT
[FreeTextEntry1] : Assessment:\par 1.  Chronic Venous insufficiency \par s/p GSV ligation on the left\par wearing compression\par symptomatic\par 2.  Prior episodes of phlebitis\par 3.  Family history of VV\par \par Plan\par 1.   To see Dr. Cuevas next month \par 2.  continue eliquis 5mg BID for now\par 3.  watch for signs and symptoms of bleeding\par 4.  He has had recurrent events now.  Likely will need some form of a/c long term but need to weigh risk vs benefit given [place] preference.  Dr. Cuevas to help with this \par 5.   Start statin therapy for  - crstor 5mg daily \par 6.  CoQ 10\par 7.  Return in 3 months.  Depending on how he does, will consider transition to half dose therapies paired with duplex and dimer testing.

## 2023-08-07 ENCOUNTER — APPOINTMENT (OUTPATIENT)
Dept: HEMATOLOGY ONCOLOGY | Facility: CLINIC | Age: 73
End: 2023-08-07
Payer: MEDICARE

## 2023-08-07 VITALS
OXYGEN SATURATION: 96 % | DIASTOLIC BLOOD PRESSURE: 100 MMHG | HEART RATE: 64 BPM | HEIGHT: 71 IN | SYSTOLIC BLOOD PRESSURE: 141 MMHG | BODY MASS INDEX: 22.68 KG/M2 | RESPIRATION RATE: 16 BRPM | WEIGHT: 162 LBS

## 2023-08-07 PROCEDURE — 99205 OFFICE O/P NEW HI 60 MIN: CPT

## 2023-08-22 ENCOUNTER — NON-APPOINTMENT (OUTPATIENT)
Age: 73
End: 2023-08-22

## 2023-08-27 ENCOUNTER — NON-APPOINTMENT (OUTPATIENT)
Age: 73
End: 2023-08-27

## 2023-09-26 ENCOUNTER — APPOINTMENT (OUTPATIENT)
Dept: CARDIOLOGY | Facility: CLINIC | Age: 73
End: 2023-09-26

## 2023-10-16 ENCOUNTER — TRANSCRIPTION ENCOUNTER (OUTPATIENT)
Age: 73
End: 2023-10-16

## 2023-10-25 ENCOUNTER — APPOINTMENT (OUTPATIENT)
Dept: CARDIOLOGY | Facility: CLINIC | Age: 73
End: 2023-10-25

## 2023-12-18 ENCOUNTER — APPOINTMENT (OUTPATIENT)
Dept: HEMATOLOGY ONCOLOGY | Facility: CLINIC | Age: 73
End: 2023-12-18
Payer: MEDICARE

## 2023-12-18 VITALS
WEIGHT: 166 LBS | TEMPERATURE: 97.6 F | BODY MASS INDEX: 23.24 KG/M2 | HEART RATE: 64 BPM | OXYGEN SATURATION: 95 % | HEIGHT: 71 IN

## 2023-12-18 DIAGNOSIS — I26.99 OTHER PULMONARY EMBOLISM W/OUT ACUTE COR PULMONALE: ICD-10-CM

## 2023-12-18 PROCEDURE — 99215 OFFICE O/P EST HI 40 MIN: CPT

## 2023-12-26 ENCOUNTER — EMERGENCY (EMERGENCY)
Facility: HOSPITAL | Age: 73
LOS: 1 days | Discharge: HOME | End: 2023-12-26
Attending: EMERGENCY MEDICINE | Admitting: EMERGENCY MEDICINE
Payer: MEDICARE

## 2023-12-26 VITALS
OXYGEN SATURATION: 94 % | RESPIRATION RATE: 18 BRPM | SYSTOLIC BLOOD PRESSURE: 144 MMHG | HEART RATE: 77 BPM | TEMPERATURE: 97 F | DIASTOLIC BLOOD PRESSURE: 72 MMHG

## 2023-12-26 VITALS
SYSTOLIC BLOOD PRESSURE: 152 MMHG | HEIGHT: 71 IN | RESPIRATION RATE: 16 BRPM | HEART RATE: 72 BPM | WEIGHT: 162.04 LBS | TEMPERATURE: 99 F | DIASTOLIC BLOOD PRESSURE: 98 MMHG | OXYGEN SATURATION: 94 %

## 2023-12-26 DIAGNOSIS — Z90.49 ACQUIRED ABSENCE OF OTHER SPECIFIED PARTS OF DIGESTIVE TRACT: Chronic | ICD-10-CM

## 2023-12-26 PROCEDURE — 71250 CT THORAX DX C-: CPT | Mod: MA

## 2023-12-26 PROCEDURE — 71250 CT THORAX DX C-: CPT | Mod: 26,MA

## 2023-12-26 PROCEDURE — 74176 CT ABD & PELVIS W/O CONTRAST: CPT | Mod: MA

## 2023-12-26 PROCEDURE — 74176 CT ABD & PELVIS W/O CONTRAST: CPT | Mod: 26,MA

## 2023-12-26 PROCEDURE — 72125 CT NECK SPINE W/O DYE: CPT | Mod: MA

## 2023-12-26 PROCEDURE — 70450 CT HEAD/BRAIN W/O DYE: CPT | Mod: MA

## 2023-12-26 PROCEDURE — 70450 CT HEAD/BRAIN W/O DYE: CPT | Mod: 26,MA

## 2023-12-26 PROCEDURE — 72125 CT NECK SPINE W/O DYE: CPT | Mod: 26,MA

## 2023-12-26 PROCEDURE — 99284 EMERGENCY DEPT VISIT MOD MDM: CPT | Mod: 25

## 2023-12-26 PROCEDURE — 99284 EMERGENCY DEPT VISIT MOD MDM: CPT | Mod: FS

## 2023-12-26 RX ORDER — BACITRACIN ZINC 500 UNIT/G
1 OINTMENT IN PACKET (EA) TOPICAL ONCE
Refills: 0 | Status: COMPLETED | OUTPATIENT
Start: 2023-12-26 | End: 2023-12-26

## 2023-12-26 RX ORDER — LIDOCAINE 4 G/100G
1 CREAM TOPICAL ONCE
Refills: 0 | Status: COMPLETED | OUTPATIENT
Start: 2023-12-26 | End: 2023-12-26

## 2023-12-26 RX ORDER — ACETAMINOPHEN 500 MG
975 TABLET ORAL ONCE
Refills: 0 | Status: COMPLETED | OUTPATIENT
Start: 2023-12-26 | End: 2023-12-26

## 2023-12-26 RX ADMIN — Medication 1 APPLICATION(S): at 16:32

## 2023-12-26 RX ADMIN — LIDOCAINE 1 PATCH: 4 CREAM TOPICAL at 16:32

## 2023-12-26 RX ADMIN — Medication 975 MILLIGRAM(S): at 16:33

## 2023-12-26 NOTE — ED ADULT NURSE NOTE - OBJECTIVE STATEMENT
pt ambulatory from triage a&ox4 with complaints of fall off ladder while cleaning gutters. takes eliquis. denies hitting head. c/o pain to left side ribs, no deformity/bruising noted. bruising present to right medial thigh. also with abrasion to L elbow, bleeding minimally. states pain 7/10 worse with movement. moving all extremities equally.

## 2023-12-26 NOTE — ED PROVIDER NOTE - CARE PROVIDER_API CALL
Kaylie Jd  Orthopaedic Surgery  [address], [address]  [place] [address]  Phone: [phone]  Fax: [phone]  Follow Up Time: 1-3 Days   Kyalie Jd  Orthopaedic Surgery  [address], [address]  [place] [address]  Phone: [phone]  Fax: [phone]  Follow Up Time: 1-3 Days

## 2023-12-26 NOTE — ED PROVIDER NOTE - ATTENDING APP SHARED VISIT CONTRIBUTION OF CARE
73-year-old male history of DVT/PE on Eliquis, fall from ladder 3 to 4 feet, patient awake and alert, noted left elbow abrasion, left outer rib tenderness, bruising of right medial thigh, tenderness to the left hip area, abdomen soft nontender, follow-up CT head, CT cervical spine, CT chest, CT abdomen and pelvis, pain control, bacitracin for abrasion and reevaluate.

## 2023-12-26 NOTE — ED ADULT TRIAGE NOTE - CHIEF COMPLAINT QUOTE
I fell off a ladder and I am on eliquis. I don't take blood transfusions so the doctor said any time I fall to get checked out by the hospital. I fell on the ladder on my left side and my right inner thigh hit  it is a 6 foot ladder and I was not at the top

## 2023-12-26 NOTE — ED PROVIDER NOTE - PATIENT PORTAL LINK FT
You can access the FollowMyHealth Patient Portal offered by  by registering at the following website: [email or web address]. By joining [facility]’s FollowMyHealth portal, you will also be able to view your health information using other applications (apps) compatible with our system. You can access the FollowMyHealth Patient Portal offered by [facility] by registering at the following website: [email or web address]. By joining [facility]’s FollowMyHealth portal, you will also be able to view your health information using other applications (apps) compatible with our system.

## 2023-12-26 NOTE — ED PROVIDER NOTE - NSFOLLOWUPINSTRUCTIONS_ED_ALL_ED_FT
1) Follow-up with Orthopedics, See referred doctor. Call today/next business day for close, prompt follow-up.  2) Return to Emergency room for any worsening or persistent pain, weakness, numbness, fever, color change to extremity, or any other concerning symptoms.  3) You can consider discussing with your doctor if physical therapy or further imaging as an MRI may be beneficial.     Rib Fracture    WHAT YOU NEED TO KNOW:    A rib fracture is a crack or break in a rib bone. Rib fractures usually heal within 6 weeks. You should be able to return to your usual activities before that time.  Rib Fracture    DISCHARGE INSTRUCTIONS:    Call your local emergency number (911 in the [place]) if:    You have trouble breathing.    You have new or increased pain.  Return to the emergency department if:    Your pain does not get better, even after treatment.    You have a fever or a cough.  Call your doctor if:    You have questions or concerns about your condition or care.    Medicines: You may need any of the following:    NSAIDs, such as ibuprofen, help decrease swelling, pain, and fever. This medicine is available with or without a doctor's order. NSAIDs can cause stomach bleeding or kidney problems in certain people. If you take blood thinner medicine, always ask your healthcare provider if NSAIDs are safe for you. Always read the medicine label and follow directions.    Prescription pain medicine may be given. Ask your healthcare provider how to take this medicine safely. Some prescription pain medicines contain acetaminophen. Do not take other medicines that contain acetaminophen without talking to your healthcare provider. Too much acetaminophen may cause liver damage. Prescription pain medicine may cause constipation. Ask your healthcare provider how to prevent or treat constipation.    Take your medicine as directed. Contact your healthcare provider if you think your medicine is not helping or if you have side effects. Tell your provider if you are allergic to any medicine. Keep a list of the medicines, vitamins, and herbs you take. Include the amounts, and when and why you take them. Bring the list or the pill bottles to follow-up visits. Carry your medicine list with you in case of an emergency.  Self-care:    Take deep breaths and cough 10 times each hour. This will decrease your risk for a lung infection. Hug a pillow on your injured side to decrease pain while you take deep breaths. Take a deep breath and hold it for as long as you can. Let the air out and then cough. Deep breaths help open your airway. You may be given an incentive spirometer to help you take deep breaths. Put the plastic piece in your mouth and take a slow, deep breath, then let the air out and cough. Repeat these steps 10 times every hour.  How to use and Incentive Spirometer      Rest and limit activity as directed. Do not pull, push, or lift objects. Start to do more as your pain decreases. Ask your healthcare provider how much activity you can do.    Apply ice on your chest near your fractured rib for 15 to 20 minutes every hour or as directed. Use an ice pack, or put crushed ice in a plastic bag. Cover it with a towel. Ice helps prevent tissue damage and decreases swelling and pain.  Follow up with your doctor as directed: Write down your questions so you remember to ask them during your visits. 1) Follow-up with Orthopedics, See referred doctor. Call today/next business day for close, prompt follow-up.  2) Return to Emergency room for any worsening or persistent pain, weakness, numbness, fever, color change to extremity, or any other concerning symptoms.  3) You can consider discussing with your doctor if physical therapy or further imaging as an MRI may be beneficial.     Rib Contusion      A rib contusion is a deep bruise on the rib area. Contusions are the result of a blunt trauma that causes bleeding and injury to the tissues under the skin. A rib contusion may involve bruising of the ribs and of the skin and muscles in the area. The skin over the contusion may turn blue, purple, or yellow. Minor injuries result in a painless contusion. More severe contusions may be painful and swollen for a few weeks.    What are the causes?  This condition is usually caused by a hard, direct hit to an area of the body. This often occurs while playing contact sports.    What are the signs or symptoms?  Symptoms of this condition include:  Swelling and redness of the injured area.  Discoloration of the injured area.  Tenderness and soreness of the injured area.  Pain with or without movement.  Pain when breathing in.  How is this diagnosed?  This condition may be diagnosed based on:  Your symptoms and medical history.  A physical exam.  Imaging tests—such as an X-ray, CT scan, or MRI—to determine if there were internal injuries or broken bones (fractures).  How is this treated?  This condition may be treated with:  Rest. This is often the best treatment for a rib contusion.  Ice packs. This reduces swelling and inflammation.  Deep-breathing exercises. These may be recommended to reduce the risk for lung collapse and pneumonia.  Medicines. Over-the-counter or prescription medicines may be given to control pain.  Injection of a numbing medicine around the nerve near your injury (nerve block).  Follow these instructions at home:  Medicines    Take over-the-counter and prescription medicines only as told by your health care provider.  Ask your health care provider if the medicine prescribed to you:  Requires you to avoid driving or using machinery.  Can cause constipation. You may need to take these actions to prevent or treat constipation:  Drink enough fluid to keep your urine pale yellow.  Take over-the-counter or prescription medicines.  Eat foods that are high in fiber, such as beans, whole grains, and fresh fruits and vegetables.  Limit foods that are high in fat and processed sugars, such as fried or sweet foods.  Managing pain, stiffness, and swelling      If directed, put ice on the injured area. To do this:  Put ice in a plastic bag.  Place a towel between your skin and the bag.  Leave the ice on for 20 minutes, 2–3 times a day.  Remove the ice if your skin turns bright red. This is very important. If you cannot feel pain, heat, or cold, you have a greater risk of damage to the area.  Activity    Rest the injured area.  Avoid strenuous activity and any activities or movements that cause pain. Be careful during activities, and avoid bumping the injured area.  Do not lift anything that is heavier than 5 lb (2.3 kg), or the limit that you are told, until your health care provider says that it is safe.  General instructions      Do not use any products that contain nicotine or tobacco, such as cigarettes, e-cigarettes, and chewing tobacco. These can delay healing. If you need help quitting, ask your health care provider.  Do deep-breathing exercises as told by your health care provider.  If you were given an incentive spirometer, use it every 1–2 hours while you are awake, or as recommended by your health care provider. This device measures how well you are filling your lungs with each breath.  Keep all follow-up visits. This is important.  Contact a health care provider if you have:  Increased bruising or swelling.  Pain that is not controlled with treatment.  A fever.  Get help right away if you:  Have difficulty breathing or shortness of breath.  Develop a continual cough, or you cough up thick or bloody mucus from your lungs (sputum).  Feel nauseous or you vomit.  Have pain in your abdomen.  These symptoms may represent a serious problem that is an emergency. Do not wait to see if the symptoms will go away. Get medical help right away. Call your local emergency services (911 in the [place] Do not drive yourself to the hospital.    Summary  A rib contusion is a deep bruise on your rib area. Contusions are the result of a blunt trauma that causes bleeding and injury to the tissues under the skin.  The skin over the contusion may turn blue, purple, or yellow. Minor injuries may cause a painless contusion. More severe contusions may be painful and swollen for a few weeks.  Rest the injured area. Avoid strenuous activity and any activities or movements that cause pain.  This information is not intended to replace advice given to you by your health care provider. Make sure you discuss any questions you have with your health care provider.    Document Revised: 03/24/2021 Document Reviewed: 03/24/2021  Elsevier Patient Education © 2023 [employer]

## 2023-12-26 NOTE — ED ADULT NURSE NOTE - NSFALLHARMRISKINTERV_ED_ALL_ED
Communicate risk of Fall with Harm to all staff, patient, and family/Provide visual cue: red socks, yellow wristband, yellow gown, etc/Reinforce activity limits and safety measures with patient and family/Bed in lowest position, wheels locked, appropriate side rails in place/Call bell, personal items and telephone in reach/Instruct patient to call for assistance before getting out of bed/chair/stretcher/Non-slip footwear applied when patient is off stretcher/[place] to call system/Physically safe environment - no spills, clutter or unnecessary equipment/Purposeful Proactive Rounding/Room/bathroom lighting operational, light cord in reach Communicate risk of Fall with Harm to all staff, patient, and family/Provide visual cue: red socks, yellow wristband, yellow gown, etc/Reinforce activity limits and safety measures with patient and family/Bed in lowest position, wheels locked, appropriate side rails in place/Call bell, personal items and telephone in reach/Instruct patient to call for assistance before getting out of bed/chair/stretcher/Non-slip footwear applied when patient is off stretcher/[place] to call system/Physically safe environment - no spills, clutter or unnecessary equipment/Purposeful Proactive Rounding/Room/bathroom lighting operational, light cord in reach

## 2023-12-26 NOTE — ED PROVIDER NOTE - PHYSICAL EXAMINATION
Constitutional: Awake, Alert, non-toxic. NAD  HEAD: Normocephalic, atraumatic.   EYES: PERRL, EOM intact, conjunctiva and sclera are clear bilaterally  ENT: No rhinorrhea, normal pharynx, patent, no tonsillar exudate or enlargement, mucous membranes pink/moist, no erythema, no drooling or stridor.   NECK: Supple, non-tender  BACK: No midline or paraspinal TTP of cervical/thoracic/lumbar spine, FROM. No ecchymosis or hematomas. (+) left rib ttp   CARDIOVASCULAR: Normal S1, S2; regular rate and rhythm.  RESPIRATORY: Normal respiratory effort; breath sounds CTAB, no wheezes, rhonchi, or rales. Speaking in full sentences. No accessory muscle use.   ABDOMEN: Soft; non-tender, non-distended, no ecchymosis.   EXTREMITIES: Full passive and active ROM in all extremities; (+) left elbow abrasion with out TTP or swelling, hips non-tender to palpation; distal pulses palpable and symmetric, no edema, no crepitus or step off  SKIN: Warm, dry; good skin turgor, no apparent lesions or rashes, (+) right inner thigh ecchymosis, brisk capillary refill.  NEURO: A&O x3. Sensory and motor functions are grossly intact. Speech is normal. Appearance and judgement seem appropriate for gender and age.

## 2023-12-26 NOTE — ED PROVIDER NOTE - PROGRESS NOTE DETAILS
pt given copy of results and advised of importance of acute follow up for aneurysm. All questions were answered. Discussed the importance of prompt, close medical follow-up. Patient will return with any changes, concerns or persistent/worsening symptoms.  Patient verbalized understanding.

## 2023-12-26 NOTE — ED PROVIDER NOTE - PROVIDER TOKENS
PROVIDER:[TOKEN:[[id number]],FOLLOWUP:[1-3 Days]] PROVIDER:[TOKEN:[[id number]],FOLLOWUP:[1-3 Days]]

## 2023-12-26 NOTE — ED PROVIDER NOTE - OBJECTIVE STATEMENT
73-year-old male with past medical history of hypertension and PEs (on Eliquis) presents today due to a fall off a ladder.  Patient reports he is about 3-4 steps up on the ladder in which it fell over and he landed on his left side.  Patient injured his left ribs, left elbow, and sustained a bruise to his right inner thigh.  Patient sustained an abrasion to his left elbow and is up-to-date with tetanus.  Patient reports pain to the left ribs described as aching and currently 6 out of 10.  Patient denies head injury, LOC, vomiting, abdominal pain, shortness of breath, or any other complaints.

## 2023-12-26 NOTE — ED PROVIDER NOTE - CARE PLAN
Principal Discharge DX:	Rib pain on left side  Secondary Diagnosis:	Fall from ladder   1 Principal Discharge DX:	Rib pain on left side  Secondary Diagnosis:	Fall from ladder  Secondary Diagnosis:	Abdominal aneurysm

## 2023-12-26 NOTE — ED PROVIDER NOTE - NS ED ATTENDING STATEMENT MOD
This was a shared visit with the J[facility]. I reviewed and verified the documentation and independently performed the documented:

## 2024-01-04 ENCOUNTER — RX RENEWAL (OUTPATIENT)
Age: 74
End: 2024-01-04

## 2024-01-24 ENCOUNTER — APPOINTMENT (OUTPATIENT)
Dept: CARDIOLOGY | Facility: CLINIC | Age: 74
End: 2024-01-24
Payer: MEDICARE

## 2024-01-24 VITALS
BODY MASS INDEX: 23.24 KG/M2 | SYSTOLIC BLOOD PRESSURE: 158 MMHG | HEIGHT: 71 IN | HEART RATE: 70 BPM | DIASTOLIC BLOOD PRESSURE: 99 MMHG | OXYGEN SATURATION: 95 % | WEIGHT: 166 LBS

## 2024-01-24 DIAGNOSIS — E78.5 HYPERLIPIDEMIA, UNSPECIFIED: ICD-10-CM

## 2024-01-24 DIAGNOSIS — I82.409 ACUTE EMBOLISM AND THROMBOSIS OF UNSPECIFIED DEEP VEINS OF UNSPECIFIED LOWER EXTREMITY: ICD-10-CM

## 2024-01-24 PROCEDURE — 99214 OFFICE O/P EST MOD 30 MIN: CPT

## 2024-01-24 RX ORDER — APIXABAN 5 MG/1
5 TABLET, FILM COATED ORAL
Qty: 180 | Refills: 3 | Status: ACTIVE | COMMUNITY
Start: 2023-06-06 | End: 1900-01-01

## 2024-01-24 RX ORDER — ROSUVASTATIN CALCIUM 5 MG/1
5 TABLET, FILM COATED ORAL
Qty: 90 | Refills: 1 | Status: DISCONTINUED | COMMUNITY
Start: 2023-07-18 | End: 2024-01-24

## 2024-01-24 NOTE — REASON FOR VISIT
[Follow-Up - Clinic] : a clinic follow-up of [FreeTextEntry2] : Varicose Veins [FreeTextEntry1] : 1/24/2024  Since last visit patient reports DOING WELL ON ELIQUIS Seen by Dr. Cuevas - plan for continued A/C No show to Dr. Duran for statin intolerance.  BP elevated on initial BP reading   Elqiuis 5mg BID Zetia 10mg  Amlodipine 2.5mg  PCP is checking LDL readings  He is not fasting this morning  Fell off a ladder 1 month ago, all ok .  now knows not to climb ladders  Not on rousvastatin    8/2023 Patient showing potential Statin intolerance. Referral to Lipidology clinic.  7/18/2023  Eliquis 5mg BID continues No bleeding issues Went to Dr. 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   LDL was 181 Hemoglobin stable on labwork from July 11th.    6/6/2023 Was on half dose apixa for distal DVT / GSV thrombus (this dose was chosen because of Jehobva;s witness status) Ran out, had covid.   [place] sick.  Went to ER, Saddle PE.  Had thrombectomy .  Discharged late May here with his family  on eliquis and iron feeling better BP and HR are ok  No bleeding issues   3/28/2023 Went for a long walk last week now is having LEFT sided calf pains and tenderness he is off a/c  here with his wife  otherwise no sob, no CP , no palps  6/7/2022 Here for followup visit Completed 45 days of half dose xarleto 10mg daily stopped in a few days ago on exam he has a non-tender palpable mid to distal GSV cord Plan for venous duplex today to re-assess shows CHRONIC R GSV thrombus  NO CP or SOB Seeing urology tomorrow for elevated PSA  4/12/2022 Here for followup visit.   Doing well Here with his wife Reitred 1 year ago.  He has a palpable cord of the R GSV, large segment Leg was itchy, red and painful It has improved since then, but there is still a cord.   He reports that his PSA is elevated.  Seen by urologist in November/December.     Medications: Losartan 25mg daily   No other meds.   2018: 67 year old male here for evaluation of varicose veins.  He has had VV for many years.  3 years ago he underwent a left GSV ligation at [place] with Dr. Bartlett.  He states that he has darkening of the skin of the R medial malleolar area.  He has "flares" sometimes which are relieved with heat, walking, compression and topical cayenne pepper.  He works - stands all day.  He has had several episodes of superficial phelbitis, but not currently.  He is here with his wife.  No prior DVt.  No history of DVT.  His las venous duplex was several years ago.    Family history Mother with Varicose Veins   Meds: Natto supplement

## 2024-01-24 NOTE — ASSESSMENT
[FreeTextEntry1] : Assessment: 1. Chronic Venous insufficiency s/p GSV ligation on the left  symptomatic 2. Prior episodes of phlebitis 3. Family history of VV 4.  4.3 cm asc aortic aneurysm    Plan 1.  Appreciated Dr. Cuevas  2.  continue eliquis 5mg BID for now 3. watch for signs and symptoms of bleeding 4. LDL elevated, on zetia,  PCP to check lipid panel 5.  He may fly.  6.  Echocardiogram December of 2024 to watch aortc aneurysm . 7.  BP is a bit elevated today, home BP pressure monitoring x 1 week call if readings are above 130 Average.   8.  RTC after summer (sept 2024) 9.  Labwork today, if LDL is above 100, will refer to Dr. Duran  - continue zetia for now

## 2024-01-24 NOTE — PHYSICAL EXAM
[General Appearance - Well Developed] : well developed [Normal Appearance] : normal appearance [Well Groomed] : well groomed [General Appearance - Well Nourished] : well nourished [No Deformities] : no deformities [General Appearance - In No Acute Distress] : no acute distress [Normal Conjunctiva] : the conjunctiva exhibited no abnormalities [Eyelids - No Xanthelasma] : the eyelids demonstrated no xanthelasmas [No Oral Pallor] : no oral pallor [Normal Oral Mucosa] : normal oral mucosa [No Oral Cyanosis] : no oral cyanosis [Normal Jugular Venous A Waves Present] : normal jugular venous A waves present [Normal Jugular Venous V Waves Present] : normal jugular venous V waves present [No Jugular Venous Wiseman A Waves] : no jugular venous wiseman A waves [Respiration, Rhythm And Depth] : normal respiratory rhythm and effort [Exaggerated Use Of Accessory Muscles For Inspiration] : no accessory muscle use [Auscultation Breath Sounds / Voice Sounds] : lungs were clear to auscultation bilaterally [Heart Rate And Rhythm] : heart rate and rhythm were normal [Heart Sounds] : normal S1 and S2 [Murmurs] : no murmurs present [Abdomen Soft] : soft [Abdomen Tenderness] : non-tender [Abnormal Walk] : normal gait [Abdomen Mass (___ Cm)] : no abdominal mass palpated [Gait - Sufficient For Exercise Testing] : the gait was sufficient for exercise testing [Nail Clubbing] : no clubbing of the fingernails [Cyanosis, Localized] : no localized cyanosis [Petechial Hemorrhages (___cm)] : no petechial hemorrhages [Skin Color & Pigmentation] : normal skin color and pigmentation [] : no rash [Skin Lesions] : no skin lesions [No Venous Stasis] : no venous stasis [No Skin Ulcers] : no skin ulcer [No Xanthoma] : no  xanthoma was observed [Abnormal Color] : abnormal color and pigmentation [Nail Clubbing] : no clubbing of the fingernails [Cyanosis Of Fingers] : no cyanosis of the fingers [Toes Cyanosis] : no cyanosis of the toes [Raynaud's Phenomenon] : the fingers do not pallavi with cold challenge [Petechiae Upper Ext] : no petechiae on the upper extremities [Petechiae Lower Ext] : no petechiae on the lower extremities [Osler's Nodes] : Osler's nodes were not seen [Splinter Hemorrhages] : no splinter hemorrhages of the nail were seen [Ischemia Upper Ext] : there were no ischemic changes of the upper extremities [FreeTextEntry1] : Scattered varicose veins.  There is hyperpigmentation of the medial malleolar areas.   The right GSV mid to distal has a small palp cord, without symtoms

## 2024-01-25 LAB
ALBUMIN SERPL ELPH-MCNC: 4.6 G/DL
ALP BLD-CCNC: 129 U/L
ALT SERPL-CCNC: 31 U/L
ANION GAP SERPL CALC-SCNC: 13 MMOL/L
AST SERPL-CCNC: 26 U/L
BILIRUB SERPL-MCNC: 0.7 MG/DL
BUN SERPL-MCNC: 15 MG/DL
CALCIUM SERPL-MCNC: 9.3 MG/DL
CHLORIDE SERPL-SCNC: 102 MMOL/L
CHOLEST SERPL-MCNC: 227 MG/DL
CO2 SERPL-SCNC: 25 MMOL/L
CREAT SERPL-MCNC: 0.96 MG/DL
EGFR: 83 ML/MIN/1.73M2
GLUCOSE SERPL-MCNC: 81 MG/DL
HCT VFR BLD CALC: 49.6 %
HDLC SERPL-MCNC: 58 MG/DL
HGB BLD-MCNC: 16.5 G/DL
LDLC SERPL CALC-MCNC: 151 MG/DL
MCHC RBC-ENTMCNC: 30.3 PG
MCHC RBC-ENTMCNC: 33.3 GM/DL
MCV RBC AUTO: 91 FL
NONHDLC SERPL-MCNC: 169 MG/DL
PLATELET # BLD AUTO: 189 K/UL
POTASSIUM SERPL-SCNC: 4.5 MMOL/L
PROT SERPL-MCNC: 7.2 G/DL
RBC # BLD: 5.45 M/UL
RBC # FLD: 13.7 %
SODIUM SERPL-SCNC: 139 MMOL/L
TRIGL SERPL-MCNC: 102 MG/DL
WBC # FLD AUTO: 5.1 K/UL

## 2024-02-01 ENCOUNTER — NON-APPOINTMENT (OUTPATIENT)
Age: 74
End: 2024-02-01

## 2024-02-21 DIAGNOSIS — I10 ESSENTIAL (PRIMARY) HYPERTENSION: ICD-10-CM

## 2024-02-21 RX ORDER — AMLODIPINE BESYLATE 5 MG/1
5 TABLET ORAL
Qty: 30 | Refills: 5 | Status: ACTIVE | COMMUNITY
Start: 2024-02-21 | End: 1900-01-01

## 2024-06-10 ENCOUNTER — APPOINTMENT (OUTPATIENT)
Dept: HEMATOLOGY ONCOLOGY | Facility: CLINIC | Age: 74
End: 2024-06-10

## 2024-06-17 ENCOUNTER — TRANSCRIPTION ENCOUNTER (OUTPATIENT)
Age: 74
End: 2024-06-17

## 2024-07-15 ENCOUNTER — APPOINTMENT (OUTPATIENT)
Dept: HEMATOLOGY ONCOLOGY | Facility: CLINIC | Age: 74
End: 2024-07-15
Payer: MEDICARE

## 2024-07-15 VITALS
TEMPERATURE: 98.1 F | SYSTOLIC BLOOD PRESSURE: 119 MMHG | OXYGEN SATURATION: 97 % | HEART RATE: 78 BPM | DIASTOLIC BLOOD PRESSURE: 81 MMHG | HEIGHT: 71 IN | BODY MASS INDEX: 23.1 KG/M2 | WEIGHT: 165 LBS

## 2024-07-15 DIAGNOSIS — I26.99 OTHER PULMONARY EMBOLISM W/OUT ACUTE COR PULMONALE: ICD-10-CM

## 2024-07-15 DIAGNOSIS — I82.409 ACUTE EMBOLISM AND THROMBOSIS OF UNSPECIFIED DEEP VEINS OF UNSPECIFIED LOWER EXTREMITY: ICD-10-CM

## 2024-07-15 PROCEDURE — G2211 COMPLEX E/M VISIT ADD ON: CPT

## 2024-07-15 PROCEDURE — 99215 OFFICE O/P EST HI 40 MIN: CPT

## 2024-07-22 ENCOUNTER — APPOINTMENT (OUTPATIENT)
Dept: ULTRASOUND IMAGING | Facility: CLINIC | Age: 74
End: 2024-07-22

## 2024-09-03 NOTE — PHYSICAL EXAM
[General Appearance - Well Developed] : well developed [Normal Appearance] : normal appearance [Well Groomed] : well groomed [General Appearance - Well Nourished] : well nourished [No Deformities] : no deformities [General Appearance - In No Acute Distress] : no acute distress [Normal Conjunctiva] : the conjunctiva exhibited no abnormalities [Eyelids - No Xanthelasma] : the eyelids demonstrated no xanthelasmas [Normal Oral Mucosa] : normal oral mucosa [No Oral Pallor] : no oral pallor [No Oral Cyanosis] : no oral cyanosis [Normal Jugular Venous A Waves Present] : normal jugular venous A waves present [Normal Jugular Venous V Waves Present] : normal jugular venous V waves present [No Jugular Venous Wiseman A Waves] : no jugular venous wiseman A waves [Respiration, Rhythm And Depth] : normal respiratory rhythm and effort [Exaggerated Use Of Accessory Muscles For Inspiration] : no accessory muscle use [Auscultation Breath Sounds / Voice Sounds] : lungs were clear to auscultation bilaterally [Heart Rate And Rhythm] : heart rate and rhythm were normal [Heart Sounds] : normal S1 and S2 [Murmurs] : no murmurs present [Abdomen Soft] : soft [Abdomen Tenderness] : non-tender [Abdomen Mass (___ Cm)] : no abdominal mass palpated [Abnormal Walk] : normal gait [Gait - Sufficient For Exercise Testing] : the gait was sufficient for exercise testing [Nail Clubbing] : no clubbing of the fingernails [Cyanosis, Localized] : no localized cyanosis [Petechial Hemorrhages (___cm)] : no petechial hemorrhages [Skin Color & Pigmentation] : normal skin color and pigmentation [] : no rash [No Venous Stasis] : no venous stasis [Skin Lesions] : no skin lesions [No Skin Ulcers] : no skin ulcer [No Xanthoma] : no  xanthoma was observed [Cyanosis Of Fingers] : no cyanosis of the fingers [Toes Cyanosis] : no cyanosis of the toes [Raynaud's Phenomenon] : the fingers do not pallavi with cold challenge [Petechiae Upper Ext] : no petechiae on the upper extremities [Petechiae Lower Ext] : no petechiae on the lower extremities [Splinter Hemorrhages] : no splinter hemorrhages of the nail were seen [Osler's Nodes] : Osler's nodes were not seen [Ischemia Upper Ext] : there were no ischemic changes of the upper extremities [FreeTextEntry1] : Scattered varicose veins.  There is hyperpigmentation of the medial malleolar areas.   The right GSV mid to distal has a small palp cord, without symtoms [Abnormal Color] : abnormal color and pigmentation

## 2024-09-03 NOTE — REASON FOR VISIT
[Follow-Up - Clinic] : a clinic follow-up of [FreeTextEntry2] : Varicose Veins [FreeTextEntry1] : 9/4/2024  Since last visit patient reports...  1/24/2024  Since last visit patient reports DOING WELL ON ELIQUIS Seen by Dr. Cuevas - plan for continued A/C No show to Dr. Duran for statin intolerance.  BP elevated on initial BP reading   Elqiuis 5mg BID Zetia 10mg  Amlodipine 2.5mg  PCP is checking LDL readings  He is not fasting this morning  Fell off a ladder 1 month ago, all ok .  now knows not to climb ladders  Not on rousvastatin    8/2023 Patient showing potential Statin intolerance. Referral to Lipidology clinic.  7/18/2023  Eliquis 5mg BID continues No bleeding issues Went to Dr. 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   LDL was 181 Hemoglobin stable on labwork from July 11th.    6/6/2023 Was on half dose apixa for distal DVT / GSV thrombus (this dose was chosen because of Jehobva;s witness status) Ran out, had covid.   [place] sick.  Went to ER, Saddle PE.  Had thrombectomy .  Discharged late May here with his family  on eliquis and iron feeling better BP and HR are ok  No bleeding issues   3/28/2023 Went for a long walk last week now is having LEFT sided calf pains and tenderness he is off a/c  here with his wife  otherwise no sob, no CP , no palps  6/7/2022 Here for followup visit Completed 45 days of half dose xarleto 10mg daily stopped in a few days ago on exam he has a non-tender palpable mid to distal GSV cord Plan for venous duplex today to re-assess shows CHRONIC R GSV thrombus  NO CP or SOB Seeing urology tomorrow for elevated PSA  4/12/2022 Here for followup visit.   Doing well Here with his wife Reitred 1 year ago.  He has a palpable cord of the R GSV, large segment Leg was itchy, red and painful It has improved since then, but there is still a cord.   He reports that his PSA is elevated.  Seen by urologist in November/December.     Medications: Losartan 25mg daily   No other meds.   2018: 67 year old male here for evaluation of varicose veins.  He has had VV for many years.  3 years ago he underwent a left GSV ligation at [place] with Dr. Bartlett.  He states that he has darkening of the skin of the R medial malleolar area.  He has "flares" sometimes which are relieved with heat, walking, compression and topical cayenne pepper.  He works - stands all day.  He has had several episodes of superficial phelbitis, but not currently.  He is here with his wife.  No prior DVt.  No history of DVT.  His las venous duplex was several years ago.    Family history Mother with Varicose Veins   Meds: Natto supplement

## 2024-09-04 ENCOUNTER — APPOINTMENT (OUTPATIENT)
Dept: CARDIOLOGY | Facility: CLINIC | Age: 74
End: 2024-09-04

## 2024-09-24 ENCOUNTER — NON-APPOINTMENT (OUTPATIENT)
Age: 74
End: 2024-09-24

## 2024-09-25 ENCOUNTER — APPOINTMENT (OUTPATIENT)
Dept: CARDIOLOGY | Facility: CLINIC | Age: 74
End: 2024-09-25
Payer: MEDICARE

## 2024-09-25 ENCOUNTER — NON-APPOINTMENT (OUTPATIENT)
Age: 74
End: 2024-09-25

## 2024-09-25 VITALS
DIASTOLIC BLOOD PRESSURE: 82 MMHG | OXYGEN SATURATION: 100 % | BODY MASS INDEX: 22.68 KG/M2 | HEART RATE: 64 BPM | WEIGHT: 162 LBS | HEIGHT: 71 IN | SYSTOLIC BLOOD PRESSURE: 144 MMHG

## 2024-09-25 VITALS — SYSTOLIC BLOOD PRESSURE: 129 MMHG | DIASTOLIC BLOOD PRESSURE: 83 MMHG

## 2024-09-25 DIAGNOSIS — Z82.49 FAMILY HISTORY OF ISCHEMIC HEART DISEASE AND OTHER DISEASES OF THE CIRCULATORY SYSTEM: ICD-10-CM

## 2024-09-25 DIAGNOSIS — Z00.00 ENCOUNTER FOR GENERAL ADULT MEDICAL EXAMINATION W/OUT ABNORMAL FINDINGS: ICD-10-CM

## 2024-09-25 DIAGNOSIS — Z78.9 OTHER SPECIFIED HEALTH STATUS: ICD-10-CM

## 2024-09-25 DIAGNOSIS — I82.409 ACUTE EMBOLISM AND THROMBOSIS OF UNSPECIFIED DEEP VEINS OF UNSPECIFIED LOWER EXTREMITY: ICD-10-CM

## 2024-09-25 PROCEDURE — G2211 COMPLEX E/M VISIT ADD ON: CPT

## 2024-09-25 PROCEDURE — 99214 OFFICE O/P EST MOD 30 MIN: CPT

## 2024-09-26 LAB
25(OH)D3 SERPL-MCNC: 44.1 NG/ML
ALBUMIN SERPL ELPH-MCNC: 4.6 G/DL
ALP BLD-CCNC: 96 U/L
ALT SERPL-CCNC: 24 U/L
ANION GAP SERPL CALC-SCNC: 12 MMOL/L
AST SERPL-CCNC: 26 U/L
BILIRUB SERPL-MCNC: 0.8 MG/DL
BUN SERPL-MCNC: 16 MG/DL
CALCIUM SERPL-MCNC: 9.3 MG/DL
CHLORIDE SERPL-SCNC: 104 MMOL/L
CHOLEST SERPL-MCNC: 251 MG/DL
CO2 SERPL-SCNC: 26 MMOL/L
CREAT SERPL-MCNC: 0.94 MG/DL
DEPRECATED D DIMER PPP IA-ACNC: <150 NG/ML DDU
EGFR: 85 ML/MIN/1.73M2
ESTIMATED AVERAGE GLUCOSE: 108 MG/DL
GLUCOSE SERPL-MCNC: 81 MG/DL
HBA1C MFR BLD HPLC: 5.4 %
HCT VFR BLD CALC: 47.7 %
HDLC SERPL-MCNC: 59 MG/DL
HGB BLD-MCNC: 15.9 G/DL
LDLC SERPL CALC-MCNC: 177 MG/DL
MCHC RBC-ENTMCNC: 29.8 PG
MCHC RBC-ENTMCNC: 33.3 GM/DL
MCV RBC AUTO: 89.5 FL
NONHDLC SERPL-MCNC: 192 MG/DL
PLATELET # BLD AUTO: 202 K/UL
POTASSIUM SERPL-SCNC: 4.1 MMOL/L
PROT SERPL-MCNC: 7.2 G/DL
RBC # BLD: 5.33 M/UL
RBC # FLD: 14.1 %
SODIUM SERPL-SCNC: 142 MMOL/L
TRIGL SERPL-MCNC: 85 MG/DL
WBC # FLD AUTO: 5.81 K/UL

## 2024-09-30 NOTE — ASSESSMENT
[FreeTextEntry1] : Assessment: 1. Chronic Venous insufficiency s/p GSV ligation on the left  symptomatic 2. Prior episodes of phlebitis 3. Family history of VV 4.  4.3 cm ascending aortic aneurysm  5. Saddle PE in 5/2023. He underwent thrombectomy at [facility] 6. L LE DVT  Plan 1.History of PE / DVT Continue Eliquis 5 mg BID.  Repeat LE venous duplex. Labs today including D-dimer. If D-dimer negative and LE venous duplex stable. Will plan to transition to Eliquis 2.5 mg BID. If dose reduced. Plan to repeat LE venous duplex and D-dimer in 6 weeks. 2. HLD Lipidology referral. LDL remains elevated. Labs today, 3. Health Maintenance Healthy diet and exercise. 4. Ascending Aorta Aneurysm Surveillance TTE with Cardiologist.  5. BP controlled. 6. Follow-up in 6 months. Call with any questions. Office will call with test results.       I, Dr. Gael Mckeon, personally performed the evaluation and management (E/M) services for this established patient who presents today.  That E/M includes conducting the examination, assessing all new/exacerbated conditions, and establishing a new plan of care.  Today, my ACP, Anne Quezada, was here to observe my evaluation and management services for this new problem/exacerbated condition to be followed going forward.

## 2024-09-30 NOTE — REASON FOR VISIT
[FreeTextEntry2] : Varicose Veins [FreeTextEntry1] : 9/25/2024  Since last visit patient reports no C/P or SOB. No LE pain or edema.  No bleeding issues. Zetia stopped due to intolerance.  Medications: Elqiuis 5mg BID Amlodipine 2.5mg  1/24/2024  Since last visit patient reports DOING WELL ON ELIQUIS Seen by Dr. Cuevas - plan for continued A/C No show to Dr. Duran for statin intolerance.  BP elevated on initial BP reading   Elqiuis 5mg BID Zetia 10mg  Amlodipine 2.5mg  PCP is checking LDL readings  He is not fasting this morning  Fell off a ladder 1 month ago, all ok .  now knows not to climb ladders  Not on rousvastatin    8/2023 Patient showing potential Statin intolerance. Referral to Lipidology clinic.  7/18/2023  Eliquis 5mg BID continues No bleeding issues Went to Dr. 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   LDL was 181 Hemoglobin stable on labwork from July 11th.    6/6/2023 Was on half dose apixa for distal DVT / GSV thrombus (this dose was chosen because of Jehobva;s witness status) Ran out, had covid.   [place] sick.  Went to ER, Saddle PE.  Had thrombectomy .  Discharged late May here with his family  on eliquis and iron feeling better BP and HR are ok  No bleeding issues   3/28/2023 Went for a long walk last week now is having LEFT sided calf pains and tenderness he is off a/c  here with his wife  otherwise no sob, no CP , no palps  6/7/2022 Here for followup visit Completed 45 days of half dose xarleto 10mg daily stopped in a few days ago on exam he has a non-tender palpable mid to distal GSV cord Plan for venous duplex today to re-assess shows CHRONIC R GSV thrombus  NO CP or SOB Seeing urology tomorrow for elevated PSA  4/12/2022 Here for followup visit.   Doing well Here with his wife Reitred 1 year ago.  He has a palpable cord of the R GSV, large segment Leg was itchy, red and painful It has improved since then, but there is still a cord.   He reports that his PSA is elevated.  Seen by urologist in November/December.     Medications: Losartan 25mg daily   No other meds.   2018: 67 year old male here for evaluation of varicose veins.  He has had VV for many years.  3 years ago he underwent a left GSV ligation at [place] with Dr. Bartlett.  He states that he has darkening of the skin of the R medial malleolar area.  He has "flares" sometimes which are relieved with heat, walking, compression and topical cayenne pepper.  He works - stands all day.  He has had several episodes of superficial phelbitis, but not currently.  He is here with his wife.  No prior DVt.  No history of DVT.  His las venous duplex was several years ago.    Family history Mother with Varicose Veins   Meds: Natto supplement

## 2024-09-30 NOTE — ASSESSMENT
[FreeTextEntry1] : Assessment: 1. Chronic Venous insufficiency s/p GSV ligation on the left  symptomatic 2. Prior episodes of phlebitis 3. Family history of VV 4.  4.3 cm ascending aortic aneurysm  5. Saddle PE in 5/2023. He underwent thrombectomy at [facility] 6. L LE DVT  Plan 1.History of PE / DVT Continue Eliquis 5 mg BID.  Repeat LE venous duplex. Labs today including D-dimer. If D-dimer negative and LE venous duplex stable. Will plan to transition to Eliquis 2.5 mg BID. If dose reduced. Plan to repeat LE venous duplex and D-dimer in 6 weeks. 2. HLD Lipidology referral. LDL remains elevated. Labs today, 3. Health Maintenance Healthy diet and exercise. 4. Ascending Aorta Aneurysm Surveillance TTE with Cardiologist.  5. BP controlled. 6. Follow-up in 6 months. Call with any questions. Office will call with test results.       I, Dr. Gael Mckeon, personally performed the evaluation and management (E/M) services for this established patient who presents today.  That E/M includes conducting the examination, assessing all new/exacerbated conditions, and establishing a new plan of care.  Today, my ACP, Anne Quezada, was here to observe my evaluation and management services for this new problem/exacerbated condition to be followed going forward.

## 2024-09-30 NOTE — PHYSICAL EXAM
[Oriented To Time, Place, And Person] : oriented to person, place, and time [Nail Clubbing] : no clubbing of the fingernails [Cyanosis Of Fingers] : no cyanosis of the fingers [Toes Cyanosis] : no cyanosis of the toes [Raynaud's Phenomenon] : the fingers do not pallavi with cold challenge [Petechiae Upper Ext] : no petechiae on the upper extremities [Petechiae Lower Ext] : no petechiae on the lower extremities [Splinter Hemorrhages] : no splinter hemorrhages of the nail were seen [Osler's Nodes] : Osler's nodes were not seen [Ischemia Upper Ext] : there were no ischemic changes of the upper extremities

## 2024-09-30 NOTE — REASON FOR VISIT
[FreeTextEntry2] : Varicose Veins [FreeTextEntry1] : 9/25/2024  Since last visit patient reports no C/P or SOB. No LE pain or edema.  No bleeding issues. Zetia stopped due to intolerance.  Medications: Elqiuis 5mg BID Amlodipine 2.5mg  1/24/2024  Since last visit patient reports DOING WELL ON ELIQUIS Seen by Dr. Cuevas - plan for continued A/C No show to Dr. Duran for statin intolerance.  BP elevated on initial BP reading   Elqiuis 5mg BID Zetia 10mg  Amlodipine 2.5mg  PCP is checking LDL readings  He is not fasting this morning  Fell off a ladder 1 month ago, all ok .  now knows not to climb ladders  Not on rousvastatin    8/2023 Patient showing potential Statin intolerance. Referral to Lipidology clinic.  7/18/2023  Eliquis 5mg BID continues No bleeding issues Went to Dr. 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   LDL was 181 Hemoglobin stable on labwork from July 11th.    6/6/2023 Was on half dose apixa for distal DVT / GSV thrombus (this dose was chosen because of Jehobva;s witness status) Ran out, had covid.   [place] sick.  Went to ER, Saddle PE.  Had thrombectomy .  Discharged late May here with his family  on eliquis and iron feeling better BP and HR are ok  No bleeding issues   3/28/2023 Went for a long walk last week now is having LEFT sided calf pains and tenderness he is off a/c  here with his wife  otherwise no sob, no CP , no palps  6/7/2022 Here for followup visit Completed 45 days of half dose xarleto 10mg daily stopped in a few days ago on exam he has a non-tender palpable mid to distal GSV cord Plan for venous duplex today to re-assess shows CHRONIC R GSV thrombus  NO CP or SOB Seeing urology tomorrow for elevated PSA  4/12/2022 Here for followup visit.   Doing well Here with his wife Reitred 1 year ago.  He has a palpable cord of the R GSV, large segment Leg was itchy, red and painful It has improved since then, but there is still a cord.   He reports that his PSA is elevated.  Seen by urologist in November/December.     Medications: Losartan 25mg daily   No other meds.   2018: 67 year old male here for evaluation of varicose veins.  He has had VV for many years.  3 years ago he underwent a left GSV ligation at [place] with Dr. Bartlett.  He states that he has darkening of the skin of the R medial malleolar area.  He has "flares" sometimes which are relieved with heat, walking, compression and topical cayenne pepper.  He works - stands all day.  He has had several episodes of superficial phelbitis, but not currently.  He is here with his wife.  No prior DVt.  No history of DVT.  His las venous duplex was several years ago.    Family history Mother with Varicose Veins   Meds: Natto supplement

## 2024-11-21 ENCOUNTER — APPOINTMENT (OUTPATIENT)
Dept: ULTRASOUND IMAGING | Facility: CLINIC | Age: 74
End: 2024-11-21
Payer: MEDICARE

## 2024-11-21 PROCEDURE — 93970 EXTREMITY STUDY: CPT

## 2024-11-27 DIAGNOSIS — I82.409 ACUTE EMBOLISM AND THROMBOSIS OF UNSPECIFIED DEEP VEINS OF UNSPECIFIED LOWER EXTREMITY: ICD-10-CM

## 2025-01-13 ENCOUNTER — APPOINTMENT (OUTPATIENT)
Dept: HEMATOLOGY ONCOLOGY | Facility: CLINIC | Age: 75
End: 2025-01-13
Payer: MEDICARE

## 2025-01-13 VITALS
HEART RATE: 66 BPM | HEIGHT: 71 IN | OXYGEN SATURATION: 96 % | TEMPERATURE: 98 F | SYSTOLIC BLOOD PRESSURE: 134 MMHG | BODY MASS INDEX: 22.68 KG/M2 | DIASTOLIC BLOOD PRESSURE: 87 MMHG | WEIGHT: 162 LBS

## 2025-01-13 DIAGNOSIS — I82.409 ACUTE EMBOLISM AND THROMBOSIS OF UNSPECIFIED DEEP VEINS OF UNSPECIFIED LOWER EXTREMITY: ICD-10-CM

## 2025-01-13 DIAGNOSIS — I87.2 VENOUS INSUFFICIENCY (CHRONIC) (PERIPHERAL): ICD-10-CM

## 2025-01-13 DIAGNOSIS — I26.99 OTHER PULMONARY EMBOLISM W/OUT ACUTE COR PULMONALE: ICD-10-CM

## 2025-01-13 PROCEDURE — 99214 OFFICE O/P EST MOD 30 MIN: CPT

## 2025-01-13 PROCEDURE — G2211 COMPLEX E/M VISIT ADD ON: CPT

## 2025-03-20 ENCOUNTER — APPOINTMENT (OUTPATIENT)
Dept: ULTRASOUND IMAGING | Facility: CLINIC | Age: 75
End: 2025-03-20
Payer: MEDICARE

## 2025-03-20 ENCOUNTER — OUTPATIENT (OUTPATIENT)
Dept: OUTPATIENT SERVICES | Facility: HOSPITAL | Age: 75
LOS: 1 days | End: 2025-03-20
Payer: MEDICARE

## 2025-03-20 DIAGNOSIS — I82.409 ACUTE EMBOLISM AND THROMBOSIS OF UNSPECIFIED DEEP VEINS OF UNSPECIFIED LOWER EXTREMITY: ICD-10-CM

## 2025-03-20 DIAGNOSIS — Z90.49 ACQUIRED ABSENCE OF OTHER SPECIFIED PARTS OF DIGESTIVE TRACT: Chronic | ICD-10-CM

## 2025-03-20 PROCEDURE — 93970 EXTREMITY STUDY: CPT | Mod: 26

## 2025-03-20 PROCEDURE — 93970 EXTREMITY STUDY: CPT

## 2025-03-27 ENCOUNTER — APPOINTMENT (OUTPATIENT)
Dept: CARDIOLOGY | Facility: CLINIC | Age: 75
End: 2025-03-27
Payer: MEDICARE

## 2025-03-27 VITALS
HEART RATE: 65 BPM | SYSTOLIC BLOOD PRESSURE: 146 MMHG | WEIGHT: 161 LBS | BODY MASS INDEX: 22.46 KG/M2 | OXYGEN SATURATION: 95 % | DIASTOLIC BLOOD PRESSURE: 82 MMHG

## 2025-03-27 DIAGNOSIS — I82.409 ACUTE EMBOLISM AND THROMBOSIS OF UNSPECIFIED DEEP VEINS OF UNSPECIFIED LOWER EXTREMITY: ICD-10-CM

## 2025-03-27 DIAGNOSIS — I26.99 OTHER PULMONARY EMBOLISM W/OUT ACUTE COR PULMONALE: ICD-10-CM

## 2025-03-27 PROCEDURE — G2211 COMPLEX E/M VISIT ADD ON: CPT

## 2025-03-27 PROCEDURE — 99214 OFFICE O/P EST MOD 30 MIN: CPT

## 2025-03-27 RX ORDER — AMLODIPINE BESYLATE 2.5 MG/1
2.5 TABLET ORAL
Qty: 90 | Refills: 3 | Status: ACTIVE | COMMUNITY
Start: 2025-03-27 | End: 1900-01-01

## 2025-03-28 ENCOUNTER — APPOINTMENT (OUTPATIENT)
Dept: CARDIOLOGY | Facility: CLINIC | Age: 75
End: 2025-03-28
Payer: MEDICARE

## 2025-03-28 ENCOUNTER — NON-APPOINTMENT (OUTPATIENT)
Age: 75
End: 2025-03-28

## 2025-03-28 VITALS
HEART RATE: 62 BPM | DIASTOLIC BLOOD PRESSURE: 82 MMHG | BODY MASS INDEX: 22.4 KG/M2 | HEIGHT: 71 IN | SYSTOLIC BLOOD PRESSURE: 138 MMHG | OXYGEN SATURATION: 99 % | WEIGHT: 160 LBS

## 2025-03-28 DIAGNOSIS — E78.5 HYPERLIPIDEMIA, UNSPECIFIED: ICD-10-CM

## 2025-03-28 DIAGNOSIS — I10 ESSENTIAL (PRIMARY) HYPERTENSION: ICD-10-CM

## 2025-03-28 DIAGNOSIS — Z00.00 ENCOUNTER FOR GENERAL ADULT MEDICAL EXAMINATION W/OUT ABNORMAL FINDINGS: ICD-10-CM

## 2025-03-28 DIAGNOSIS — I25.10 ATHEROSCLEROTIC HEART DISEASE OF NATIVE CORONARY ARTERY W/OUT ANGINA PECTORIS: ICD-10-CM

## 2025-03-28 PROCEDURE — 99214 OFFICE O/P EST MOD 30 MIN: CPT

## 2025-03-28 PROCEDURE — G2211 COMPLEX E/M VISIT ADD ON: CPT

## 2025-03-28 PROCEDURE — G0537: CPT

## 2025-03-28 PROCEDURE — 93000 ELECTROCARDIOGRAM COMPLETE: CPT

## 2025-03-28 RX ORDER — EVOLOCUMAB 140 MG/ML
140 INJECTION, SOLUTION SUBCUTANEOUS
Qty: 1 | Refills: 5 | Status: ACTIVE | COMMUNITY
Start: 2025-03-28 | End: 1900-01-01

## 2025-06-02 ENCOUNTER — RX RENEWAL (OUTPATIENT)
Age: 75
End: 2025-06-02

## 2025-07-14 ENCOUNTER — APPOINTMENT (OUTPATIENT)
Dept: HEMATOLOGY ONCOLOGY | Facility: CLINIC | Age: 75
End: 2025-07-14

## 2025-07-14 ENCOUNTER — APPOINTMENT (OUTPATIENT)
Dept: HEMATOLOGY ONCOLOGY | Facility: CLINIC | Age: 75
End: 2025-07-14
Payer: MEDICARE

## 2025-07-14 PROCEDURE — 99214 OFFICE O/P EST MOD 30 MIN: CPT | Mod: 2W

## 2025-07-14 PROCEDURE — G2211 COMPLEX E/M VISIT ADD ON: CPT | Mod: 2W
